# Patient Record
Sex: FEMALE | Race: BLACK OR AFRICAN AMERICAN
[De-identification: names, ages, dates, MRNs, and addresses within clinical notes are randomized per-mention and may not be internally consistent; named-entity substitution may affect disease eponyms.]

---

## 2017-09-14 ENCOUNTER — HOSPITAL (OUTPATIENT)
Dept: OTHER | Age: 52
End: 2017-09-14
Attending: EMERGENCY MEDICINE

## 2017-09-28 ENCOUNTER — HOSPITAL (OUTPATIENT)
Dept: OTHER | Age: 52
End: 2017-09-28
Attending: OBSTETRICS & GYNECOLOGY

## 2017-09-28 LAB
GLYCOHEMOGLOBIN: 6.3 % (ref 4.5–5.6)
TSH SERPL-ACNC: 2.3 MCUNIT/ML (ref 0.35–5)

## 2017-10-25 ENCOUNTER — HOSPITAL (OUTPATIENT)
Dept: OTHER | Age: 52
End: 2017-10-25
Attending: INTERNAL MEDICINE

## 2017-10-25 LAB
ALBUMIN SERPL-MCNC: 3.5 GM/DL (ref 3.6–5.1)
ALBUMIN/GLOB SERPL: 0.9 {RATIO} (ref 1–2.4)
ALP SERPL-CCNC: 77 UNIT/L (ref 45–117)
ALT SERPL-CCNC: 68 UNIT/L
ANION GAP SERPL CALC-SCNC: 12 MMOL/L (ref 10–20)
AST SERPL-CCNC: 31 UNIT/L
BILIRUB SERPL-MCNC: 0.2 MG/DL (ref 0.2–1)
BUN SERPL-MCNC: 9 MG/DL (ref 6–20)
BUN/CREAT SERPL: 14 (ref 7–25)
CALCIUM SERPL-MCNC: 8.5 MG/DL (ref 8.4–10.2)
CHLORIDE: 109 MMOL/L (ref 98–107)
CHOLEST SERPL-MCNC: 116 MG/DL
CHOLEST/HDLC SERPL: 1.8 {RATIO}
CO2 SERPL-SCNC: 27 MMOL/L (ref 21–32)
CREAT 24H UR-MRATE: 218 MG/DL
CREAT SERPL-MCNC: 0.66 MG/DL (ref 0.51–0.95)
GLOBULIN SER-MCNC: 3.9 GM/DL (ref 2–4)
GLUCOSE SERPL-MCNC: 92 MG/DL (ref 65–99)
HDLC SERPL-MCNC: 64 MG/DL
LDLC SERPL CALC-MCNC: 47 MG/DL
LENGTH OF FAST TIME PATIENT: 12 HR
LENGTH OF FAST TIME PATIENT: 12 HR
MICROALBUMIN UR-MCNC: 1.78 MG/DL
MICROALBUMIN/CREAT UR: 8.2 MCG/MG
NONHDLC SERPL-MCNC: 52 MG/DL
POTASSIUM SERPL-SCNC: 4.3 MMOL/L (ref 3.4–5.1)
PROT SERPL-MCNC: 7.4 GM/DL (ref 6.4–8.2)
SODIUM SERPL-SCNC: 144 MMOL/L (ref 135–145)
TRIGLYCERIDE (TRIGP): 23 MG/DL

## 2017-11-15 ENCOUNTER — NURSE ONLY (OUTPATIENT)
Dept: ENDOCRINOLOGY CLINIC | Facility: CLINIC | Age: 52
End: 2017-11-15

## 2017-11-15 VITALS — WEIGHT: 208 LBS | BODY MASS INDEX: 38 KG/M2 | DIASTOLIC BLOOD PRESSURE: 84 MMHG | SYSTOLIC BLOOD PRESSURE: 126 MMHG

## 2017-11-15 DIAGNOSIS — E11.9 DIABETES MELLITUS WITHOUT COMPLICATION (HCC): Primary | ICD-10-CM

## 2017-11-15 PROCEDURE — G0108 DIAB MANAGE TRN  PER INDIV: HCPCS | Performed by: DIETITIAN, REGISTERED

## 2017-11-15 RX ORDER — LOSARTAN POTASSIUM 50 MG/1
50 TABLET ORAL DAILY
COMMUNITY
End: 2019-02-03 | Stop reason: ALTCHOICE

## 2017-11-17 NOTE — PROGRESS NOTES
Madi Brigido  : 1965 attended Step 1 Diabetic Education:    Date: 11/15/2017  Referring MD: Dr. Justus Leon Start time: 4pm End time: 5pm    /84   Wt 208 lb   BMI 38.04 kg/m²     A1C: 17 -  6.3%     Depression Screen - PHQ-2 SCORE: 0 questions at this time.     Carmelo Mcarthur RN, CDE

## 2018-01-17 ENCOUNTER — NURSE ONLY (OUTPATIENT)
Dept: ENDOCRINOLOGY CLINIC | Facility: CLINIC | Age: 53
End: 2018-01-17

## 2018-01-17 DIAGNOSIS — E11.9 DIABETES MELLITUS WITHOUT COMPLICATION (HCC): Primary | ICD-10-CM

## 2018-01-17 PROCEDURE — G0109 DIAB MANAGE TRN IND/GROUP: HCPCS | Performed by: DIETITIAN, REGISTERED

## 2018-01-18 NOTE — PROGRESS NOTES
Carol Arevalo  [id number] attended Step 2 Diabetic Education:    Date: 1/18/2018 Referring MD: Jb Garrett Start time: 5:30pm End time: 7:30pm      Diabetes Overview, pathophysiology, pre-diabetes, A1C results and treatment options for diabetes mj

## 2018-02-20 ENCOUNTER — NURSE ONLY (OUTPATIENT)
Dept: FAMILY MEDICINE CLINIC | Facility: CLINIC | Age: 53
End: 2018-02-20

## 2018-02-20 VITALS
SYSTOLIC BLOOD PRESSURE: 124 MMHG | DIASTOLIC BLOOD PRESSURE: 66 MMHG | OXYGEN SATURATION: 98 % | RESPIRATION RATE: 18 BRPM | HEART RATE: 63 BPM | HEIGHT: 62 IN | TEMPERATURE: 98 F

## 2018-02-20 DIAGNOSIS — H60.501 ACUTE OTITIS EXTERNA OF RIGHT EAR, UNSPECIFIED TYPE: Primary | ICD-10-CM

## 2018-02-20 PROCEDURE — 99213 OFFICE O/P EST LOW 20 MIN: CPT | Performed by: NURSE PRACTITIONER

## 2018-02-20 NOTE — PROGRESS NOTES
HPI:    Patient ID: Melanie Rodriguez is a 45year old female. Ear Problem    There is pain in the right ear. This is a new problem. The current episode started in the past 7 days. The problem occurs constantly. The problem has been unchanged.  There has b motion. Lymphadenopathy:     She has no cervical adenopathy. Neurological: She is alert and oriented to person, place, and time. Skin: Skin is warm and dry. She is not diaphoretic. Psychiatric: She has a normal mood and affect.  Her behavior is norm bacteria or fungus. How is it treated? Your provider will carefully clean and dry your ear. If your ear is very swollen, he or she may insert a wick soaked with an antibiotic into the ear to get the medicine into the infected area.  You may need to put

## 2018-02-20 NOTE — PATIENT INSTRUCTIONS
Otitis Externa (Swimmer's Ear)   What is otitis externa? Otitis externa is an infection of the ear canal. Otitis externa is also called swimmer's ear. How does it occur? Bacteria and sometimes fungi may cause the infection.  It can result from an inju days.   How can I take care of myself? Follow the treatment plan prescribed by your healthcare provider. Your healthcare provider will tell you how to take care of your ear and how to remove the wick.    Keep water out of your ears until the infection is

## 2018-02-28 ENCOUNTER — HOSPITAL (OUTPATIENT)
Dept: OTHER | Age: 53
End: 2018-02-28
Attending: INTERNAL MEDICINE

## 2018-02-28 LAB — GLYCOHEMOGLOBIN: 6 % (ref 4.5–5.6)

## 2018-07-12 ENCOUNTER — HOSPITAL ENCOUNTER (OUTPATIENT)
Age: 53
Discharge: HOME | End: 2018-07-12
Payer: COMMERCIAL

## 2018-07-12 VITALS
SYSTOLIC BLOOD PRESSURE: 148 MMHG | OXYGEN SATURATION: 99 % | BODY MASS INDEX: 35.88 KG/M2 | DIASTOLIC BLOOD PRESSURE: 89 MMHG | WEIGHT: 195 LBS | HEIGHT: 62 IN | HEART RATE: 69 BPM | RESPIRATION RATE: 20 BRPM | TEMPERATURE: 98 F

## 2018-07-12 DIAGNOSIS — J20.9 ACUTE BRONCHITIS, UNSPECIFIED ORGANISM: Primary | ICD-10-CM

## 2018-07-12 PROCEDURE — 99214 OFFICE O/P EST MOD 30 MIN: CPT

## 2018-07-12 PROCEDURE — 99213 OFFICE O/P EST LOW 20 MIN: CPT

## 2018-07-12 RX ORDER — METHYLPREDNISOLONE 4 MG/1
TABLET ORAL
Qty: 1 PACKAGE | Refills: 0 | Status: SHIPPED | OUTPATIENT
Start: 2018-07-12 | End: 2019-02-03 | Stop reason: ALTCHOICE

## 2018-07-12 RX ORDER — AZITHROMYCIN 250 MG/1
TABLET, FILM COATED ORAL
Qty: 1 PACKAGE | Refills: 0 | Status: SHIPPED | OUTPATIENT
Start: 2018-07-12 | End: 2018-07-17

## 2018-07-12 NOTE — ED INITIAL ASSESSMENT (HPI)
C/O cough x15 days that is not improving. Sts symptoms are worse in the am. Sts other family members who have the same are better and she is not.

## 2018-07-13 NOTE — ED PROVIDER NOTES
Patient Seen in: THE [facility] END    History   Patient presents with:  Cough    Stated Complaint: cough     58-year-old female presents today with plaints of a productive cough pressure in the ears and some mild congestion.   [facility] [place] [facility] distress. HENT:   Head: Normocephalic. Right Ear: Hearing and tympanic membrane normal.   Left Ear: Hearing and tympanic membrane normal.   Nose: Mucosal edema present.    Mouth/Throat: Uvula is midline and mucous membranes are normal. Posterior orophar MG Oral Tablet Therapy Pack  Dosepack: take as directed  Qty: 1 Package Refills: 0

## 2018-10-12 ENCOUNTER — HOSPITAL (OUTPATIENT)
Dept: OTHER | Age: 53
End: 2018-10-12
Attending: INTERNAL MEDICINE

## 2018-10-12 LAB — GLYCOHEMOGLOBIN: 6.2 % (ref 4.5–5.6)

## 2018-10-28 ENCOUNTER — HOSPITAL ENCOUNTER (EMERGENCY)
Age: 53
Discharge: HOME | End: 2018-10-28
Payer: COMMERCIAL

## 2018-10-28 VITALS
SYSTOLIC BLOOD PRESSURE: 154 MMHG | WEIGHT: 195.13 LBS | RESPIRATION RATE: 20 BRPM | HEART RATE: 76 BPM | DIASTOLIC BLOOD PRESSURE: 78 MMHG | TEMPERATURE: 98 F | BODY MASS INDEX: 36 KG/M2 | OXYGEN SATURATION: 98 %

## 2018-10-28 DIAGNOSIS — B02.9 HERPES ZOSTER WITHOUT COMPLICATION: Primary | ICD-10-CM

## 2018-10-28 PROCEDURE — 99283 EMERGENCY DEPT VISIT LOW MDM: CPT

## 2018-10-28 RX ORDER — IBUPROFEN 800 MG/1
800 TABLET ORAL EVERY 8 HOURS PRN
Qty: 30 TABLET | Refills: 0 | Status: SHIPPED | OUTPATIENT
Start: 2018-10-28 | End: 2018-11-04

## 2018-10-28 RX ORDER — ACYCLOVIR 800 MG/1
800 TABLET ORAL
Qty: 35 TABLET | Refills: 0 | Status: SHIPPED | OUTPATIENT
Start: 2018-10-28 | End: 2018-11-04

## 2018-10-28 RX ORDER — TRAMADOL HYDROCHLORIDE 50 MG/1
50 TABLET ORAL EVERY 6 HOURS PRN
Qty: 18 TABLET | Refills: 0 | Status: SHIPPED | OUTPATIENT
Start: 2018-10-28 | End: 2018-11-04

## 2018-10-28 RX ORDER — TRAMADOL HYDROCHLORIDE 50 MG/1
TABLET ORAL EVERY 4 HOURS PRN
Qty: 10 TABLET | Refills: 0 | Status: SHIPPED | OUTPATIENT
Start: 2018-10-28 | End: 2018-10-28

## 2018-10-28 NOTE — ED INITIAL ASSESSMENT (HPI)
Pt c/o right shoulder pain and tingling that feels \"just like when I had shingles last night\". Pt denies rash or fever.

## 2018-10-29 NOTE — ED PROVIDER NOTES
Patient Seen in: THE [facility] Emergency Department In [place]    History   Patient presents with:  Pain (neurologic)    Stated Complaint: Pain    52-year-old female who presents to the emergency room with complaints of right arm pain.   Please states is a hi 76   Resp 20   Temp 98.4 °F (36.9 °C)   Temp src Temporal   SpO2 98 %   O2 Device None (Room air)       Current:/78   Pulse 76   Temp 98.4 °F (36.9 °C) (Temporal)   Resp 20   Wt 88.5 kg   SpO2 98%   BMI 35.69 kg/m²         Physical Exam  GENERAL: The writing. Patient and/or family agrees to return for any concerns, problems or complications as discussed and voiced understanding and all questions were answered at this time.             Disposition and Plan     Clinical Impression:  Herpes zoster without

## 2019-02-03 ENCOUNTER — OFFICE VISIT (OUTPATIENT)
Dept: FAMILY MEDICINE CLINIC | Facility: CLINIC | Age: 54
End: 2019-02-03
Payer: COMMERCIAL

## 2019-02-03 VITALS
TEMPERATURE: 98 F | HEIGHT: 62 IN | RESPIRATION RATE: 20 BRPM | BODY MASS INDEX: 36.44 KG/M2 | WEIGHT: 198 LBS | HEART RATE: 94 BPM | DIASTOLIC BLOOD PRESSURE: 80 MMHG | OXYGEN SATURATION: 98 % | SYSTOLIC BLOOD PRESSURE: 132 MMHG

## 2019-02-03 DIAGNOSIS — J01.40 ACUTE NON-RECURRENT PANSINUSITIS: Primary | ICD-10-CM

## 2019-02-03 PROCEDURE — 99213 OFFICE O/P EST LOW 20 MIN: CPT | Performed by: FAMILY MEDICINE

## 2019-02-03 RX ORDER — AZITHROMYCIN 250 MG/1
TABLET, FILM COATED ORAL
Qty: 6 TABLET | Refills: 0 | Status: SHIPPED | OUTPATIENT
Start: 2019-02-03 | End: 2019-06-10 | Stop reason: ALTCHOICE

## 2019-02-03 RX ORDER — FLUTICASONE PROPIONATE 50 MCG
2 SPRAY, SUSPENSION (ML) NASAL DAILY
Qty: 1 BOTTLE | Refills: 0 | Status: SHIPPED | OUTPATIENT
Start: 2019-02-03 | End: 2019-11-24

## 2019-02-03 NOTE — PROGRESS NOTES
Blanca Butler is a 52year old female. S:  Patient presents today with the following concerns:  URI (coughing up flem, post nasal drip, sinus pressure, ear popping, chills X 3 months )    No fevers, SOB, wheezing.   Symptoms going on and off for 3 jackie exertion  CARDIOVASCULAR: denies chest pain on exertion  GI: denies abdominal pain.   No N/V/D/C  : denies dysuria  MUSCULOSKELETAL: denies back pain  NEURO: denies headaches    EXAM:  /80   Pulse 94   Temp 98.2 °F (36.8 °C) (Oral)   Resp 20   Ht 62

## 2019-05-15 ENCOUNTER — HOSPITAL ENCOUNTER (OUTPATIENT)
Age: 54
Discharge: HOME | End: 2019-05-15
Attending: FAMILY MEDICINE
Payer: COMMERCIAL

## 2019-05-15 ENCOUNTER — APPOINTMENT (OUTPATIENT)
Dept: CT IMAGING | Age: 54
End: 2019-05-15
Attending: FAMILY MEDICINE
Payer: COMMERCIAL

## 2019-05-15 VITALS
TEMPERATURE: 99 F | HEART RATE: 62 BPM | OXYGEN SATURATION: 97 % | DIASTOLIC BLOOD PRESSURE: 77 MMHG | BODY MASS INDEX: 36 KG/M2 | SYSTOLIC BLOOD PRESSURE: 130 MMHG | WEIGHT: 198 LBS | RESPIRATION RATE: 18 BRPM

## 2019-05-15 DIAGNOSIS — R30.0 DYSURIA: ICD-10-CM

## 2019-05-15 DIAGNOSIS — R10.9 ABDOMINAL PAIN, ACUTE: ICD-10-CM

## 2019-05-15 DIAGNOSIS — R10.9 ACUTE FLANK PAIN: Primary | ICD-10-CM

## 2019-05-15 PROCEDURE — 87086 URINE CULTURE/COLONY COUNT: CPT | Performed by: FAMILY MEDICINE

## 2019-05-15 PROCEDURE — 99214 OFFICE O/P EST MOD 30 MIN: CPT

## 2019-05-15 PROCEDURE — 87660 TRICHOMONAS VAGIN DIR PROBE: CPT | Performed by: FAMILY MEDICINE

## 2019-05-15 PROCEDURE — 87510 GARDNER VAG DNA DIR PROBE: CPT | Performed by: FAMILY MEDICINE

## 2019-05-15 PROCEDURE — 87480 CANDIDA DNA DIR PROBE: CPT | Performed by: FAMILY MEDICINE

## 2019-05-15 PROCEDURE — 85025 COMPLETE CBC W/AUTO DIFF WBC: CPT | Performed by: FAMILY MEDICINE

## 2019-05-15 PROCEDURE — 87491 CHLMYD TRACH DNA AMP PROBE: CPT | Performed by: FAMILY MEDICINE

## 2019-05-15 PROCEDURE — 80053 COMPREHEN METABOLIC PANEL: CPT | Performed by: FAMILY MEDICINE

## 2019-05-15 PROCEDURE — 87591 N.GONORRHOEAE DNA AMP PROB: CPT | Performed by: FAMILY MEDICINE

## 2019-05-15 PROCEDURE — 36415 COLL VENOUS BLD VENIPUNCTURE: CPT

## 2019-05-15 PROCEDURE — 80047 BASIC METABLC PNL IONIZED CA: CPT

## 2019-05-15 PROCEDURE — 74176 CT ABD & PELVIS W/O CONTRAST: CPT | Performed by: FAMILY MEDICINE

## 2019-05-15 PROCEDURE — 81002 URINALYSIS NONAUTO W/O SCOPE: CPT | Performed by: FAMILY MEDICINE

## 2019-05-15 RX ORDER — NITROFURANTOIN 25; 75 MG/1; MG/1
100 CAPSULE ORAL 2 TIMES DAILY
Qty: 20 CAPSULE | Refills: 0 | Status: SHIPPED | OUTPATIENT
Start: 2019-05-15 | End: 2019-05-25

## 2019-05-15 RX ORDER — NAPROXEN 500 MG/1
500 TABLET ORAL 2 TIMES DAILY PRN
Qty: 20 TABLET | Refills: 0 | Status: SHIPPED | OUTPATIENT
Start: 2019-05-15 | End: 2019-05-22

## 2019-05-15 NOTE — ED INITIAL ASSESSMENT (HPI)
Right flank pain - x 2 weeks constant, no pain emds taken   frequency- x 2 weeks. Denies fever or blood in urine,  Burning with uriantion -s tarted today  Clitoris- itching stared lastthursday, took diflucan Monday. Pt still having symptoms.  Denies any vag

## 2019-05-15 NOTE — ED PROVIDER NOTES
Patient Seen in: [facility] END    History   Patient presents with:  Urinary Symptoms (urologic)  Flank Pain    Stated Complaint: 1-2 weeks back pain,bladder issues,frequrncy    HPI  53 y/o female presents with  c/o right flank pain normal. No stridor. No respiratory distress. She has no wheezes. She has no rales. Abdominal: Soft. She exhibits no mass. There is tenderness. There is no guarding. Right upper and right lower abdominal tenderness, with mild right cva tenderness .  No g limited evaluation without contrast. BILIARY:   Cholecystectomy clips. . SPLEEN:  Normal.  No enlargement or focal lesion. PANCREAS:  No elidia-pancreatic inflammatory stranding ADRENALS:  Normal.  No mass or enlargement.   KIDNEYS:  The kidneys are symmetric capsule, R-0

## 2019-05-17 ENCOUNTER — TELEPHONE (OUTPATIENT)
Dept: URGENT CARE | Age: 54
End: 2019-05-17

## 2019-05-17 NOTE — ED NOTES
Patient called back, ID per date of birth. Made aware of final urine, GC/Chlamydia and COMP metabolic panel results.

## 2019-05-17 NOTE — ED NOTES
Called to patient, message left to voicemail to call [facility] back.   (this is to notify of final urine culture, GC/Chlamydia, Vaginitis panel and Comp Metabolic panel results.)

## 2019-06-07 ENCOUNTER — HOSPITAL (OUTPATIENT)
Dept: OTHER | Age: 54
End: 2019-06-07
Attending: INTERNAL MEDICINE

## 2019-06-07 LAB
AMYLASE SERPL-CCNC: 43 UNITS/L (ref 25–115)
ERYTHROCYTE [SEDIMENTATION RATE] IN BLOOD BY WESTERGREN METHOD: 38 MM/HR (ref 0–20)
HBA1C MFR BLD: 10.0           24 %
HBA1C MFR BLD: 6 % (ref 4.5–5.6)
HBA1C MFR BLD: 6.0            126 %
HBA1C MFR BLD: 6.5            14 %
HBA1C MFR BLD: 7.0            154 %
HBA1C MFR BLD: 7.5            169 %
HBA1C MFR BLD: 8.0            183 %
HBA1C MFR BLD: 8.5            197 %
HBA1C MFR BLD: 9.0            212 %
HBA1C MFR BLD: 9.5            226 %
HBA1C MFR BLD: ABNORMAL %

## 2019-11-24 ENCOUNTER — OFFICE VISIT (OUTPATIENT)
Dept: FAMILY MEDICINE CLINIC | Facility: CLINIC | Age: 54
End: 2019-11-24
Payer: COMMERCIAL

## 2019-11-24 VITALS
DIASTOLIC BLOOD PRESSURE: 84 MMHG | TEMPERATURE: 98 F | OXYGEN SATURATION: 97 % | HEART RATE: 55 BPM | BODY MASS INDEX: 33.13 KG/M2 | WEIGHT: 180 LBS | HEIGHT: 62 IN | RESPIRATION RATE: 20 BRPM | SYSTOLIC BLOOD PRESSURE: 124 MMHG

## 2019-11-24 DIAGNOSIS — R01.1 HEART MURMUR: ICD-10-CM

## 2019-11-24 DIAGNOSIS — J01.00 ACUTE MAXILLARY SINUSITIS, RECURRENCE NOT SPECIFIED: Primary | ICD-10-CM

## 2019-11-24 PROCEDURE — 99213 OFFICE O/P EST LOW 20 MIN: CPT | Performed by: PHYSICIAN ASSISTANT

## 2019-11-24 RX ORDER — AZITHROMYCIN 250 MG/1
TABLET, FILM COATED ORAL
Qty: 6 TABLET | Refills: 0 | Status: SHIPPED | OUTPATIENT
Start: 2019-11-24 | End: 2019-12-19 | Stop reason: ALTCHOICE

## 2019-11-24 RX ORDER — FLUTICASONE PROPIONATE 50 MCG
2 SPRAY, SUSPENSION (ML) NASAL DAILY
Qty: 1 BOTTLE | Refills: 0 | Status: SHIPPED | OUTPATIENT
Start: 2019-11-24 | End: 2019-12-08

## 2019-11-24 NOTE — PATIENT INSTRUCTIONS
Patient Declined AVS    Verbal Instructions given      1. Z tammy  2. Flonase  3. Follow up with PCP ASAP for murmur  4.  If worsening symptoms seek treatment

## 2019-11-24 NOTE — PROGRESS NOTES
CHIEF COMPLAINT:     Patient presents with:  Sinus Problem: s/s for 3 weeks, No OTC meds taken  Cough: wet      HPI:   Corey Singh is a 62year old female who presents with complaints of feeling sick for 3 weeks.   Symptoms include nasal congestion, abhishek retraction, no fluid, bony landmarks normal.  Left TM normal, no bulging, no retraction, no fluid, bony landmarks normal.    NOSE: nostrils patent, + discharge, nasal mucosa pink and not inflamed.  +maxillary sinus tenderness.   THROAT: oral mucosa pink, mo

## 2020-07-14 ENCOUNTER — TELEPHONE (OUTPATIENT)
Dept: ADMINISTRATIVE | Facility: HOSPITAL | Age: 55
End: 2020-07-14

## 2020-07-14 DIAGNOSIS — Z11.59 ENCOUNTER FOR SCREENING FOR OTHER VIRAL DISEASES: Primary | ICD-10-CM

## 2020-07-15 ENCOUNTER — LAB ENCOUNTER (OUTPATIENT)
Dept: LAB | Facility: HOSPITAL | Age: 55
End: 2020-07-15
Attending: NURSE PRACTITIONER
Payer: COMMERCIAL

## 2020-07-15 DIAGNOSIS — Z11.59 ENCOUNTER FOR SCREENING FOR OTHER VIRAL DISEASES: ICD-10-CM

## 2020-07-20 LAB — SARS-COV-2 BY PCR: NOT DETECTED

## 2020-11-03 ENCOUNTER — APPOINTMENT (OUTPATIENT)
Dept: LAB | Age: 55
End: 2020-11-03
Attending: INTERNAL MEDICINE
Payer: COMMERCIAL

## 2020-11-03 DIAGNOSIS — Z01.818 PRE-OP TESTING: ICD-10-CM

## 2020-11-06 PROBLEM — K64.8 INTERNAL HEMORRHOIDS: Status: ACTIVE | Noted: 2020-11-06

## 2020-11-06 PROBLEM — Z80.0 FAMILY HISTORY OF COLON CANCER: Status: ACTIVE | Noted: 2020-11-06

## 2020-11-06 PROBLEM — Z86.0101 HISTORY OF ADENOMATOUS POLYP OF COLON: Status: ACTIVE | Noted: 2020-11-06

## 2020-11-06 PROBLEM — Z86.010 HISTORY OF ADENOMATOUS POLYP OF COLON: Status: ACTIVE | Noted: 2020-11-06

## 2020-11-06 PROBLEM — K63.5 COLON POLYPS: Status: ACTIVE | Noted: 2020-11-06

## 2021-02-01 ENCOUNTER — IMMUNIZATION (OUTPATIENT)
Dept: LAB | Age: 56
End: 2021-02-01

## 2021-02-01 DIAGNOSIS — Z23 NEED FOR VACCINATION: Primary | ICD-10-CM

## 2021-02-01 PROCEDURE — 0011A COVID-19 MODERNA VACCINE: CPT

## 2021-02-01 PROCEDURE — 91301 COVID-19 MODERNA VACCINE: CPT

## 2021-02-22 ENCOUNTER — TELEPHONE (OUTPATIENT)
Dept: PHYSICAL THERAPY | Facility: HOSPITAL | Age: 56
End: 2021-02-22

## 2021-02-23 ENCOUNTER — OFFICE VISIT (OUTPATIENT)
Dept: PHYSICAL THERAPY | Age: 56
End: 2021-02-23
Attending: ORTHOPAEDIC SURGERY
Payer: COMMERCIAL

## 2021-02-23 DIAGNOSIS — M76.61 ACHILLES TENDINITIS OF RIGHT LOWER EXTREMITY: ICD-10-CM

## 2021-02-23 PROCEDURE — 97162 PT EVAL MOD COMPLEX 30 MIN: CPT | Performed by: PHYSICAL THERAPIST

## 2021-02-23 PROCEDURE — 97110 THERAPEUTIC EXERCISES: CPT | Performed by: PHYSICAL THERAPIST

## 2021-02-23 NOTE — PROGRESS NOTES
LOWER EXTREMITY EVALUATION:   Referring Physician: Dr. Hossein Cain  Diagnosis: Achilles tendinitis of right lower extremity (M76.61)        Date of Service: 2/23/2021     PATIENT SUMMARY   Marquita Vail is a 60year old female who presents to therapy toda right LE  Palpation: Acute pain right achilles, no plantar pain        AROM: (* denotes performed with pain)  Decreased figure four right LE - lateral hip pain  NWB ankle mobility is WNL.  There is decreased right ankle DF with weighted DF split stance and contact with the floor and limited her pain. There was increased achilles tenderness after evaluation. CP applied 15 minutes with good relief. Patient will continue with using cold after activity as needed.   Patient was appreciative of the assistance prov

## 2021-02-25 ENCOUNTER — OFFICE VISIT (OUTPATIENT)
Dept: PHYSICAL THERAPY | Age: 56
End: 2021-02-25
Attending: ORTHOPAEDIC SURGERY
Payer: COMMERCIAL

## 2021-02-25 PROCEDURE — 97110 THERAPEUTIC EXERCISES: CPT | Performed by: PHYSICAL THERAPIST

## 2021-02-25 PROCEDURE — 97140 MANUAL THERAPY 1/> REGIONS: CPT | Performed by: PHYSICAL THERAPIST

## 2021-02-25 NOTE — PROGRESS NOTES
Dx: Achilles tendinitis of right lower extremity (M76.61)           Authorized # of Visits:  8  Fall Risk: standard         Precautions: n/a             Subjective:   Patient states she was able to complete her HEP, no increase in pain   Objective:   No ga

## 2021-03-01 ENCOUNTER — IMMUNIZATION (OUTPATIENT)
Dept: LAB | Age: 56
End: 2021-03-01

## 2021-03-01 DIAGNOSIS — Z23 NEED FOR VACCINATION: Primary | ICD-10-CM

## 2021-03-01 PROCEDURE — 91301 COVID-19 MODERNA VACCINE: CPT

## 2021-03-01 PROCEDURE — 0012A COVID-19 MODERNA VACCINE: CPT

## 2021-03-03 ENCOUNTER — OFFICE VISIT (OUTPATIENT)
Dept: PHYSICAL THERAPY | Age: 56
End: 2021-03-03
Attending: ORTHOPAEDIC SURGERY
Payer: COMMERCIAL

## 2021-03-03 PROCEDURE — 97140 MANUAL THERAPY 1/> REGIONS: CPT | Performed by: PHYSICAL THERAPIST

## 2021-03-03 PROCEDURE — 97110 THERAPEUTIC EXERCISES: CPT | Performed by: PHYSICAL THERAPIST

## 2021-03-03 NOTE — PROGRESS NOTES
Dx: Achilles tendinitis of right lower extremity (M76.61)           Authorized # of Visits:  8  Fall Risk: standard         Precautions: n/a             Subjective:   Patient states her achilles is feeling better. She has increased pain in the morning.  She

## 2021-03-09 ENCOUNTER — OFFICE VISIT (OUTPATIENT)
Dept: PHYSICAL THERAPY | Age: 56
End: 2021-03-09
Attending: ORTHOPAEDIC SURGERY
Payer: COMMERCIAL

## 2021-03-09 PROCEDURE — 97110 THERAPEUTIC EXERCISES: CPT | Performed by: PHYSICAL THERAPIST

## 2021-03-09 PROCEDURE — 97140 MANUAL THERAPY 1/> REGIONS: CPT | Performed by: PHYSICAL THERAPIST

## 2021-03-09 NOTE — PROGRESS NOTES
Dx: Achilles tendinitis of right lower extremity (M76.61)           Authorized # of Visits:  8  Fall Risk: standard         Precautions: n/a             Subjective:   Patient states there is mild pain at her achilles when walking   Objective:   Treatment a Treatment Time: 40 min

## 2021-03-11 ENCOUNTER — APPOINTMENT (OUTPATIENT)
Dept: PHYSICAL THERAPY | Age: 56
End: 2021-03-11
Attending: ORTHOPAEDIC SURGERY
Payer: COMMERCIAL

## 2021-03-16 ENCOUNTER — APPOINTMENT (OUTPATIENT)
Dept: PHYSICAL THERAPY | Age: 56
End: 2021-03-16
Attending: ORTHOPAEDIC SURGERY
Payer: COMMERCIAL

## 2021-03-18 ENCOUNTER — OFFICE VISIT (OUTPATIENT)
Dept: PHYSICAL THERAPY | Age: 56
End: 2021-03-18
Attending: ORTHOPAEDIC SURGERY
Payer: COMMERCIAL

## 2021-03-18 PROCEDURE — 97110 THERAPEUTIC EXERCISES: CPT | Performed by: PHYSICAL THERAPIST

## 2021-03-18 PROCEDURE — 97140 MANUAL THERAPY 1/> REGIONS: CPT | Performed by: PHYSICAL THERAPIST

## 2021-03-18 NOTE — PROGRESS NOTES
Dx: Achilles tendinitis of right lower extremity (M76.61)           Authorized # of Visits:  8  Fall Risk: standard         Precautions: n/a             Subjective:   Patient states pain in the morning has not been bad as she stretches prior to getting bed support (HEP)      Soft tissue release right achilles, calf, plantar fascia, calf stretch with soft tissue mobilization, hind foot mobility  Soft tissue release right achilles, calf, plantar fascia, calf stretch with soft tissue mobilization, hind foot mob

## 2021-03-23 ENCOUNTER — APPOINTMENT (OUTPATIENT)
Dept: PHYSICAL THERAPY | Age: 56
End: 2021-03-23
Attending: ORTHOPAEDIC SURGERY
Payer: COMMERCIAL

## 2021-03-23 ENCOUNTER — TELEPHONE (OUTPATIENT)
Dept: PHYSICAL THERAPY | Facility: HOSPITAL | Age: 56
End: 2021-03-23

## 2021-03-29 ENCOUNTER — OFFICE VISIT (OUTPATIENT)
Dept: PHYSICAL THERAPY | Age: 56
End: 2021-03-29
Attending: ORTHOPAEDIC SURGERY
Payer: COMMERCIAL

## 2021-03-29 PROCEDURE — 97140 MANUAL THERAPY 1/> REGIONS: CPT | Performed by: PHYSICAL THERAPIST

## 2021-03-29 PROCEDURE — 97110 THERAPEUTIC EXERCISES: CPT | Performed by: PHYSICAL THERAPIST

## 2021-03-29 NOTE — PROGRESS NOTES
Dx: Achilles tendinitis of right lower extremity (M76.61)           Authorized # of Visits:  8  Fall Risk: standard         Precautions: n/a             Subjective:   Patient states she has resumed walking on her treadmill and is using the elliptical. Ther support (HEP) Single leg balance on airex pad > 5 reps right and left LE               Manual stretch right gastroc/soleus      Soft tissue release right achilles, calf, plantar fascia, calf stretch with soft tissue mobilization, hind foot mobility  Soft t

## 2021-03-31 ENCOUNTER — OFFICE VISIT (OUTPATIENT)
Dept: PHYSICAL THERAPY | Age: 56
End: 2021-03-31
Attending: ORTHOPAEDIC SURGERY
Payer: COMMERCIAL

## 2021-03-31 PROCEDURE — 97140 MANUAL THERAPY 1/> REGIONS: CPT | Performed by: PHYSICAL THERAPIST

## 2021-03-31 PROCEDURE — 97110 THERAPEUTIC EXERCISES: CPT | Performed by: PHYSICAL THERAPIST

## 2021-03-31 PROCEDURE — 97112 NEUROMUSCULAR REEDUCATION: CPT | Performed by: PHYSICAL THERAPIST

## 2021-03-31 NOTE — PROGRESS NOTES
Dx: Achilles tendinitis of right lower extremity (M76.61)           Authorized # of Visits:  8  Fall Risk: standard         Precautions: n/a             Subjective:   Patient states right achilles pain is greatest after sitting and first thing in the morni (HEP) Forward/retro step left LE with manual hold at anterior right ankle  Hip/hamstring mobility on step > 15 reps each     Right and left DF on step  Closed chain hip mobility with transverse plane UE   Closed chain crossover step right and left LE

## 2021-04-07 ENCOUNTER — APPOINTMENT (OUTPATIENT)
Dept: PHYSICAL THERAPY | Age: 56
End: 2021-04-07
Attending: ORTHOPAEDIC SURGERY
Payer: COMMERCIAL

## 2021-04-21 ENCOUNTER — TELEPHONE (OUTPATIENT)
Dept: PHYSICAL THERAPY | Facility: HOSPITAL | Age: 56
End: 2021-04-21

## 2021-04-26 ENCOUNTER — APPOINTMENT (OUTPATIENT)
Dept: PHYSICAL THERAPY | Age: 56
End: 2021-04-26
Attending: ORTHOPAEDIC SURGERY
Payer: COMMERCIAL

## 2021-07-06 ENCOUNTER — OFFICE VISIT (OUTPATIENT)
Dept: PODIATRY CLINIC | Facility: CLINIC | Age: 56
End: 2021-07-06
Payer: COMMERCIAL

## 2021-07-06 DIAGNOSIS — B35.1 ONYCHOMYCOSIS: Primary | ICD-10-CM

## 2021-07-06 DIAGNOSIS — L60.0 ONYCHOCRYPTOSIS: ICD-10-CM

## 2021-07-06 DIAGNOSIS — M79.674 PAIN OF TOE OF RIGHT FOOT: ICD-10-CM

## 2021-07-06 PROCEDURE — 99203 OFFICE O/P NEW LOW 30 MIN: CPT | Performed by: PODIATRIST

## 2021-07-06 RX ORDER — ACYCLOVIR 400 MG/1
1 TABLET ORAL 4 TIMES DAILY
COMMUNITY
Start: 2021-06-15 | End: 2021-08-29

## 2021-07-07 NOTE — PROGRESS NOTES
Oracio Narayanan is a 60year old female. Patient presents with:   Foot Pain: c/o Right big toenail pain, for a few years, possible removal requested        HPI:   This pleasant patient presents to the clinic with a chief complaint of right great toenail cass below  GENERAL HEALTH: feels well otherwise  SKIN: Refer to exam below  RESPIRATORY: denies shortness of breath with exertion  CARDIOVASCULAR: denies chest pain on exertion  GI: denies abdominal pain and denies heartburn  NEURO: denies headaches    EXAM:

## 2021-07-29 ENCOUNTER — HOSPITAL ENCOUNTER (OUTPATIENT)
Age: 56
Discharge: HOME | End: 2021-07-29
Attending: EMERGENCY MEDICINE
Payer: COMMERCIAL

## 2021-07-29 VITALS
DIASTOLIC BLOOD PRESSURE: 86 MMHG | RESPIRATION RATE: 20 BRPM | SYSTOLIC BLOOD PRESSURE: 140 MMHG | HEART RATE: 58 BPM | TEMPERATURE: 97 F

## 2021-07-29 DIAGNOSIS — G56.91 NEUROPATHY, ARM, RIGHT: Primary | ICD-10-CM

## 2021-07-29 PROCEDURE — 99212 OFFICE O/P EST SF 10 MIN: CPT

## 2021-07-29 NOTE — ED PROVIDER NOTES
Patient Seen in: [facility]      History   Patient presents with:  Derm Problem    Stated Complaint: right arm pain /lump x 2 weeks    HPI/Subjective:   HPI    Patient is a pleasant 58-year-old female, presenting for evaluation of right a External ear normal.      Left Ear: External ear normal.   Eyes:      Extraocular Movements: Extraocular movements intact. Conjunctiva/sclera: Conjunctivae normal.   Cardiovascular:      Rate and Rhythm: Normal rate.    Pulmonary:      Effort: Pulmonar days  For wound re-check          Medications Prescribed:  Current Discharge Medication List

## 2021-08-09 ENCOUNTER — TELEPHONE (OUTPATIENT)
Dept: PODIATRY CLINIC | Facility: CLINIC | Age: 56
End: 2021-08-09

## 2021-08-09 DIAGNOSIS — B35.1 ONYCHOMYCOSIS: Primary | ICD-10-CM

## 2021-08-09 NOTE — TELEPHONE ENCOUNTER
----- Message from Medardo Dockery DPM sent at 7/18/2021 11:10 AM CDT -----  Results reviewed. Please inform patient that fungal culture results are positive and we should proceed with Lamisil tablet therapy.   If the patient agrees we have to do a hepa

## 2021-08-09 NOTE — TELEPHONE ENCOUNTER
Spoke to patient and relayed Dr. Renetta Alvarez message as shown below. Patient verbalized understanding and agreement to proceed with lamisil tablet therapy. Liver panel order entered.

## 2021-08-10 ENCOUNTER — HOSPITAL ENCOUNTER (OUTPATIENT)
Age: 56
Discharge: HOME | End: 2021-08-10
Payer: COMMERCIAL

## 2021-08-10 ENCOUNTER — LAB ENCOUNTER (OUTPATIENT)
Dept: LAB | Age: 56
End: 2021-08-10
Attending: PODIATRIST
Payer: COMMERCIAL

## 2021-08-10 VITALS
TEMPERATURE: 98 F | HEIGHT: 62 IN | RESPIRATION RATE: 16 BRPM | DIASTOLIC BLOOD PRESSURE: 80 MMHG | BODY MASS INDEX: 34.96 KG/M2 | OXYGEN SATURATION: 99 % | WEIGHT: 190 LBS | SYSTOLIC BLOOD PRESSURE: 143 MMHG | HEART RATE: 60 BPM

## 2021-08-10 DIAGNOSIS — B35.1 ONYCHOMYCOSIS: ICD-10-CM

## 2021-08-10 DIAGNOSIS — S61.211A LACERATION OF LEFT INDEX FINGER WITHOUT FOREIGN BODY WITHOUT DAMAGE TO NAIL, INITIAL ENCOUNTER: Primary | ICD-10-CM

## 2021-08-10 LAB
ALBUMIN SERPL-MCNC: 3.5 G/DL (ref 3.4–5)
ALP LIVER SERPL-CCNC: 68 U/L
ALT SERPL-CCNC: 33 U/L
AST SERPL-CCNC: 17 U/L (ref 15–37)
BILIRUB DIRECT SERPL-MCNC: <0.1 MG/DL (ref 0–0.2)
BILIRUB SERPL-MCNC: 0.4 MG/DL (ref 0.1–2)
M PROTEIN MFR SERPL ELPH: 7.4 G/DL (ref 6.4–8.2)

## 2021-08-10 PROCEDURE — 80076 HEPATIC FUNCTION PANEL: CPT

## 2021-08-10 PROCEDURE — 99212 OFFICE O/P EST SF 10 MIN: CPT

## 2021-08-10 PROCEDURE — 36415 COLL VENOUS BLD VENIPUNCTURE: CPT

## 2021-08-10 NOTE — ED PROVIDER NOTES
Patient Seen in: [facility] [place]      History   Patient presents with:  Laceration    Stated Complaint: left finger cut    HPI/Subjective:   HPI  Patient is 61-year-old female past medical history of depression presents with left index finger Effort: Pulmonary effort is normal.   Musculoskeletal:      Comments: Left hand Exam:  1 cm healing laceration left index finger volar aspect over DIP. No signs of secondary infection. No drainage   Radial, median, ulnar nerves grossly intact.   Sensation

## 2021-08-11 ENCOUNTER — TELEPHONE (OUTPATIENT)
Dept: PODIATRY CLINIC | Facility: CLINIC | Age: 56
End: 2021-08-11

## 2021-08-11 RX ORDER — TERBINAFINE HYDROCHLORIDE 250 MG/1
250 TABLET ORAL DAILY
Qty: 45 TABLET | Refills: 0 | Status: SHIPPED | OUTPATIENT
Start: 2021-08-11 | End: 2021-08-29

## 2021-08-11 NOTE — TELEPHONE ENCOUNTER
----- Message from Vinod Perez DPM sent at 8/11/2021 11:53 AM CDT -----  Hepatic function panel is normal please call in the following prescription:   Lamisil 250 mg tablets  Dispense 45, no refill  Instructions take 1 tablet daily p.o.   Then please

## 2021-08-11 NOTE — TELEPHONE ENCOUNTER
Called pt and confirmed name and . Informed her of results and Dr. Kuldip Bonilla orders. Pharm confirmed. Pt already has f/u scheduled in 4 wks.

## 2021-08-27 ENCOUNTER — TELEPHONE (OUTPATIENT)
Dept: PODIATRY CLINIC | Facility: CLINIC | Age: 56
End: 2021-08-27

## 2021-08-27 NOTE — TELEPHONE ENCOUNTER
S/w pt and she states she started taking the lamisil on 8/24/21 and within a few hours she started getting stomach upset- diarrhea, lower mid abdominal pain, abdominal cramping, loss of appetite.  She denies any fever, chills, bloating, nausea, vomiting, bl

## 2021-08-27 NOTE — TELEPHONE ENCOUNTER
Since she had the same symptoms without any Lamisil my recommendation would to refer her back to her PCP for evaluation of her nausea and vomiting.   Nausea vomiting is a possible risk with Lamisil however she could have other problems that should be evalua

## 2021-08-29 ENCOUNTER — APPOINTMENT (OUTPATIENT)
Dept: CT IMAGING | Age: 56
End: 2021-08-29
Attending: EMERGENCY MEDICINE
Payer: COMMERCIAL

## 2021-08-29 ENCOUNTER — HOSPITAL ENCOUNTER (OUTPATIENT)
Age: 56
Discharge: ANOTHER HOSPITAL | End: 2021-08-29
Attending: EMERGENCY MEDICINE
Payer: COMMERCIAL

## 2021-08-29 ENCOUNTER — HOSPITAL ENCOUNTER (EMERGENCY)
Age: 56
Discharge: HOME | End: 2021-08-29
Attending: EMERGENCY MEDICINE
Payer: COMMERCIAL

## 2021-08-29 VITALS
TEMPERATURE: 98 F | OXYGEN SATURATION: 99 % | HEART RATE: 58 BPM | RESPIRATION RATE: 14 BRPM | HEIGHT: 62 IN | DIASTOLIC BLOOD PRESSURE: 76 MMHG | BODY MASS INDEX: 35.88 KG/M2 | WEIGHT: 195 LBS | SYSTOLIC BLOOD PRESSURE: 146 MMHG

## 2021-08-29 VITALS
BODY MASS INDEX: 35.88 KG/M2 | HEIGHT: 62 IN | HEART RATE: 112 BPM | OXYGEN SATURATION: 99 % | TEMPERATURE: 97 F | SYSTOLIC BLOOD PRESSURE: 150 MMHG | WEIGHT: 195 LBS | RESPIRATION RATE: 20 BRPM | DIASTOLIC BLOOD PRESSURE: 90 MMHG

## 2021-08-29 DIAGNOSIS — R10.9 ABDOMINAL PAIN OF UNKNOWN ETIOLOGY: Primary | ICD-10-CM

## 2021-08-29 DIAGNOSIS — R19.7 DIARRHEA, UNSPECIFIED TYPE: ICD-10-CM

## 2021-08-29 DIAGNOSIS — A09 TRAVELER'S DIARRHEA: Primary | ICD-10-CM

## 2021-08-29 LAB
ALBUMIN SERPL-MCNC: 3.6 G/DL (ref 3.4–5)
ALBUMIN/GLOB SERPL: 0.8 {RATIO} (ref 1–2)
ALP LIVER SERPL-CCNC: 77 U/L
ALT SERPL-CCNC: 35 U/L
ANION GAP SERPL CALC-SCNC: 2 MMOL/L (ref 0–18)
AST SERPL-CCNC: 27 U/L (ref 15–37)
BASOPHILS # BLD AUTO: 0.03 X10(3) UL (ref 0–0.2)
BASOPHILS NFR BLD AUTO: 0.3 %
BILIRUB SERPL-MCNC: 0.4 MG/DL (ref 0.1–2)
BUN BLD-MCNC: 10 MG/DL (ref 7–18)
CALCIUM BLD-MCNC: 9.1 MG/DL (ref 8.5–10.1)
CHLORIDE SERPL-SCNC: 110 MMOL/L (ref 98–112)
CO2 SERPL-SCNC: 27 MMOL/L (ref 21–32)
CREAT BLD-MCNC: 0.76 MG/DL
EOSINOPHIL # BLD AUTO: 0.12 X10(3) UL (ref 0–0.7)
EOSINOPHIL NFR BLD AUTO: 1.2 %
ERYTHROCYTE [DISTWIDTH] IN BLOOD BY AUTOMATED COUNT: 14.8 %
GLOBULIN PLAS-MCNC: 4.4 G/DL (ref 2.8–4.4)
GLUCOSE BLD-MCNC: 89 MG/DL (ref 70–99)
HCT VFR BLD AUTO: 39.8 %
HGB BLD-MCNC: 12.1 G/DL
IMM GRANULOCYTES # BLD AUTO: 0.04 X10(3) UL (ref 0–1)
IMM GRANULOCYTES NFR BLD: 0.4 %
LIPASE SERPL-CCNC: 115 U/L (ref 73–393)
LYMPHOCYTES # BLD AUTO: 3.87 X10(3) UL (ref 1–4)
LYMPHOCYTES NFR BLD AUTO: 39.3 %
M PROTEIN MFR SERPL ELPH: 8 G/DL (ref 6.4–8.2)
MCH RBC QN AUTO: 27.8 PG (ref 26–34)
MCHC RBC AUTO-ENTMCNC: 30.4 G/DL (ref 31–37)
MCV RBC AUTO: 91.3 FL
MONOCYTES # BLD AUTO: 0.98 X10(3) UL (ref 0.1–1)
MONOCYTES NFR BLD AUTO: 9.9 %
NEUTROPHILS # BLD AUTO: 4.81 X10 (3) UL (ref 1.5–7.7)
NEUTROPHILS # BLD AUTO: 4.81 X10(3) UL (ref 1.5–7.7)
NEUTROPHILS NFR BLD AUTO: 48.9 %
OSMOLALITY SERPL CALC.SUM OF ELEC: 287 MOSM/KG (ref 275–295)
PLATELET # BLD AUTO: 239 10(3)UL (ref 150–450)
POTASSIUM SERPL-SCNC: 4.5 MMOL/L (ref 3.5–5.1)
RBC # BLD AUTO: 4.36 X10(6)UL
SODIUM SERPL-SCNC: 139 MMOL/L (ref 136–145)
WBC # BLD AUTO: 9.9 X10(3) UL (ref 4–11)

## 2021-08-29 PROCEDURE — 99284 EMERGENCY DEPT VISIT MOD MDM: CPT

## 2021-08-29 PROCEDURE — 96360 HYDRATION IV INFUSION INIT: CPT

## 2021-08-29 PROCEDURE — 80053 COMPREHEN METABOLIC PANEL: CPT | Performed by: EMERGENCY MEDICINE

## 2021-08-29 PROCEDURE — 74177 CT ABD & PELVIS W/CONTRAST: CPT | Performed by: EMERGENCY MEDICINE

## 2021-08-29 PROCEDURE — 83690 ASSAY OF LIPASE: CPT | Performed by: EMERGENCY MEDICINE

## 2021-08-29 PROCEDURE — 99212 OFFICE O/P EST SF 10 MIN: CPT

## 2021-08-29 PROCEDURE — 85025 COMPLETE CBC W/AUTO DIFF WBC: CPT | Performed by: EMERGENCY MEDICINE

## 2021-08-29 RX ORDER — CIPROFLOXACIN 500 MG/1
500 TABLET, FILM COATED ORAL 2 TIMES DAILY
Qty: 6 TABLET | Refills: 0 | Status: SHIPPED | OUTPATIENT
Start: 2021-08-29 | End: 2021-09-01

## 2021-08-29 RX ORDER — ONDANSETRON 4 MG/1
4 TABLET, ORALLY DISINTEGRATING ORAL EVERY 4 HOURS PRN
Qty: 10 TABLET | Refills: 0 | Status: SHIPPED | OUTPATIENT
Start: 2021-08-29 | End: 2021-09-05

## 2021-08-29 RX ORDER — DICYCLOMINE HCL 20 MG
20 TABLET ORAL 4 TIMES DAILY PRN
Qty: 30 TABLET | Refills: 0 | Status: SHIPPED | OUTPATIENT
Start: 2021-08-29 | End: 2021-09-28

## 2021-08-29 NOTE — ED INITIAL ASSESSMENT (HPI)
Pt as been in [place] and she has had diarrhea for 10 days. Pt has been feeling \"woozy, and has had heart palpitations as well.  She is not feeling well

## 2021-08-29 NOTE — ED PROVIDER NOTES
Patient Seen in: THE [facility] Emergency Department In [place]      History   Patient presents with:  Abdominal Pain    Stated Complaint: abdominal pain and diarrhea    HPI/Subjective:   HPI    Patient is a 20-year-old female presenting with persistent abdom equal, round, and reactive to light. Cardiovascular:      Rate and Rhythm: Normal rate and regular rhythm. Heart sounds: Normal heart sounds. Pulmonary:      Effort: Pulmonary effort is normal.      Breath sounds: Normal breath sounds.    Abdominal CT scanning was performed from the dome of the diaphragm to the pubic symphysis with non-ionic intravenous contrast material. Post contrast coronal MPR imaging was performed. Dose reduction techniques were used.  Dose information is transmitted to the ACR pm    Follow-up:  Matty Schafer MD  [place] [id number]  [phone]                Medications Prescribed:  Current Discharge Medication List    START taking these medications    ciprofloxacin 500 MG Oral Tab  Take 1 tablet

## 2021-08-29 NOTE — ED PROVIDER NOTES
Patient Seen in: [facility] [place]      History   Patient presents with:  Abdominal Pain    Stated Complaint: ABDOMINAL PAIN    HPI/Subjective:   HPI    Patient presents with abdominal pain.   The patient states that she went to [place] recently in no acute distress. HEENT: Normocephalic, atraumatic, pupils equal round and reactive to light. Neck: Supple. Cardiovascular: Regular rate and rhythm, no murmurs. Respiratory: Lungs clear to auscultation.   Abdomen: Soft, tender to palpation across th

## 2021-09-13 ENCOUNTER — OFFICE VISIT (OUTPATIENT)
Dept: PODIATRY CLINIC | Facility: CLINIC | Age: 56
End: 2021-09-13
Payer: COMMERCIAL

## 2021-09-13 DIAGNOSIS — B35.1 ONYCHOMYCOSIS: Primary | ICD-10-CM

## 2021-09-13 DIAGNOSIS — M79.674 PAIN OF TOE OF RIGHT FOOT: ICD-10-CM

## 2021-09-13 DIAGNOSIS — L60.0 ONYCHOCRYPTOSIS: ICD-10-CM

## 2021-09-13 PROCEDURE — 99213 OFFICE O/P EST LOW 20 MIN: CPT | Performed by: PODIATRIST

## 2021-09-15 NOTE — PROGRESS NOTES
Daniel Bridges is a 60year old female. Patient presents with:  Consult: right great toe nail pain was told might be a calus. HPI:   Patient returns the clinic complaining of continued pain in her left great toe thinks may be a callus at the tip. distress  EXTREMITIES:   1. Integument: The skin on the left hallux was evaluated the nail is slightly thickened.   There is hyperkeratosis that is built up slightly at the very distal tip of the toe where the nail meets the skin and this was trimmed and de

## 2021-10-15 ENCOUNTER — APPOINTMENT (OUTPATIENT)
Dept: FAMILY MEDICINE | Age: 56
End: 2021-10-15

## 2021-10-15 ENCOUNTER — IMMUNIZATION (OUTPATIENT)
Dept: FAMILY MEDICINE | Age: 56
End: 2021-10-15

## 2021-10-15 DIAGNOSIS — Z23 NEED FOR VACCINATION: Primary | ICD-10-CM

## 2021-10-15 PROCEDURE — 90471 IMMUNIZATION ADMIN: CPT

## 2021-10-15 PROCEDURE — 90686 IIV4 VACC NO PRSV 0.5 ML IM: CPT

## 2021-11-01 ENCOUNTER — OFFICE VISIT (OUTPATIENT)
Dept: PODIATRY CLINIC | Facility: CLINIC | Age: 56
End: 2021-11-01
Payer: COMMERCIAL

## 2021-11-01 VITALS — HEART RATE: 68 BPM | SYSTOLIC BLOOD PRESSURE: 124 MMHG | DIASTOLIC BLOOD PRESSURE: 76 MMHG

## 2021-11-01 DIAGNOSIS — B35.1 ONYCHOMYCOSIS: Primary | ICD-10-CM

## 2021-11-01 DIAGNOSIS — L60.0 ONYCHOCRYPTOSIS: ICD-10-CM

## 2021-11-01 DIAGNOSIS — M79.674 PAIN OF TOE OF RIGHT FOOT: ICD-10-CM

## 2021-11-01 PROCEDURE — 3078F DIAST BP <80 MM HG: CPT | Performed by: PODIATRIST

## 2021-11-01 PROCEDURE — 11750 EXCISION NAIL&NAIL MATRIX: CPT | Performed by: PODIATRIST

## 2021-11-01 PROCEDURE — 3074F SYST BP LT 130 MM HG: CPT | Performed by: PODIATRIST

## 2021-11-01 RX ORDER — AZITHROMYCIN 250 MG/1
TABLET, FILM COATED ORAL
Qty: 6 TABLET | Refills: 0 | Status: SHIPPED | OUTPATIENT
Start: 2021-11-01 | End: 2021-11-06

## 2021-11-01 NOTE — PROGRESS NOTES
Elaine Shown is a 60year old female. Patient presents with: Follow - Up: follow up on fungus possible P&A. pain 1/10 right great toe        HPI:   Patient returns the clinic for possible PNA still having pain in her right great toe.   At today's visit 124/76 (BP Location: Right arm, Patient Position: Sitting, Cuff Size: adult)   Pulse 68   GENERAL: well developed, well nourished, in no apparent distress  EXTREMITIES:   1.  Integument: Skin on her right foot was evaluated the medial lateral nail borders a 161 of full strength phenol using Pedinol phenol sticks. All areas that received phenol were then flushed with copious amounts of alcohol. The affected toes were dressed with antibiotic ointment gauze and a lightly applied Coban wrap for compression.   Rylan

## 2021-11-09 ENCOUNTER — OFFICE VISIT (OUTPATIENT)
Dept: PODIATRY CLINIC | Facility: CLINIC | Age: 56
End: 2021-11-09
Payer: COMMERCIAL

## 2021-11-09 DIAGNOSIS — Z98.890 POST-OPERATIVE STATE: Primary | ICD-10-CM

## 2021-11-09 PROCEDURE — 99024 POSTOP FOLLOW-UP VISIT: CPT | Performed by: PODIATRIST

## 2021-11-12 RX ORDER — TERBINAFINE HYDROCHLORIDE 250 MG/1
TABLET ORAL
Qty: 45 TABLET | Refills: 0 | Status: SHIPPED | OUTPATIENT
Start: 2021-11-12

## 2021-11-14 NOTE — PROGRESS NOTES
Annalisa Craft is a 60year old female. Patient presents with: Follow - Up: right ingrown grown nail follow up. denies pain in office today. HPI:   Patient is here for follow-up of right ingrown toenail she is not having any pain.   At today's visi no apparent distress  EXTREMITIES:   Nail edges are healing uneventfully medial and laterally on the right hallux.   ASSESSMENT AND PLAN:   Diagnoses and all orders for this visit:    Post-operative state        Plan: Patient continue to soak until all drai

## 2021-11-15 ENCOUNTER — TELEPHONE (OUTPATIENT)
Dept: PODIATRY CLINIC | Facility: CLINIC | Age: 56
End: 2021-11-15

## 2021-11-15 RX ORDER — DOXYCYCLINE HYCLATE 100 MG/1
100 CAPSULE ORAL 2 TIMES DAILY
Qty: 20 CAPSULE | Refills: 0 | Status: SHIPPED | OUTPATIENT
Start: 2021-11-15

## 2021-11-15 NOTE — TELEPHONE ENCOUNTER
S/w pt and she states the right hallux is still painful and swollen on both borders. She states the pain was the same at her appt last wk on 11/9/21, but she feels like the pain and swelling should be less by now and is concerned that its not improving. Danny Forman

## 2021-11-15 NOTE — TELEPHONE ENCOUNTER
Pt calling states toes still swelling and she has to put ice on them also states still throbbing please advise

## 2021-11-15 NOTE — TELEPHONE ENCOUNTER
Please call the patient and let her know that I have prescribed doxycycline to her pharmacy for period of 10 days tell her to continue with the soaks as well as the mupirocin ointment thank you

## 2021-11-15 NOTE — TELEPHONE ENCOUNTER
Called pt and notified her per Dr. Bhanu Valencia orders. She verbalized understanding and will CB if no improvement.

## 2021-11-29 ENCOUNTER — OFFICE VISIT (OUTPATIENT)
Dept: PODIATRY CLINIC | Facility: CLINIC | Age: 56
End: 2021-11-29
Payer: COMMERCIAL

## 2021-11-29 DIAGNOSIS — Z98.890 POST-OPERATIVE STATE: Primary | ICD-10-CM

## 2021-11-29 PROCEDURE — 99212 OFFICE O/P EST SF 10 MIN: CPT | Performed by: PODIATRIST

## 2021-12-03 NOTE — PROGRESS NOTES
Mauro Lynn is a 60year old female. Patient presents with: Follow - Up: Right great toe ingrown nail f/u, pt states not doing better still painful and has little swelling.         HPI:   Patient returns to the clinic at today's date still complaining and denies heartburn  NEURO: denies headaches    EXAM:   There were no vitals taken for this visit.   GENERAL: well developed, well nourished, in no apparent distress  EXTREMITIES:   The PNA sites are healing uneventfully I thinned out the toenail with a bu

## 2021-12-17 ENCOUNTER — LAB ENCOUNTER (OUTPATIENT)
Dept: LAB | Age: 56
End: 2021-12-17
Attending: INTERNAL MEDICINE
Payer: COMMERCIAL

## 2021-12-17 DIAGNOSIS — E11.9 TYPE 2 DIABETES MELLITUS WITHOUT COMPLICATION, WITHOUT LONG-TERM CURRENT USE OF INSULIN (HCC): ICD-10-CM

## 2021-12-17 DIAGNOSIS — M19.90 ARTHRITIS: ICD-10-CM

## 2021-12-17 DIAGNOSIS — E03.9 ACQUIRED HYPOTHYROIDISM: ICD-10-CM

## 2021-12-17 PROCEDURE — 84443 ASSAY THYROID STIM HORMONE: CPT

## 2021-12-17 PROCEDURE — 80053 COMPREHEN METABOLIC PANEL: CPT

## 2021-12-17 PROCEDURE — 86376 MICROSOMAL ANTIBODY EACH: CPT

## 2021-12-17 PROCEDURE — 84481 FREE ASSAY (FT-3): CPT

## 2021-12-17 PROCEDURE — 80061 LIPID PANEL: CPT

## 2021-12-17 PROCEDURE — 86038 ANTINUCLEAR ANTIBODIES: CPT

## 2021-12-17 PROCEDURE — 84439 ASSAY OF FREE THYROXINE: CPT

## 2021-12-17 PROCEDURE — 36415 COLL VENOUS BLD VENIPUNCTURE: CPT

## 2021-12-17 PROCEDURE — 83036 HEMOGLOBIN GLYCOSYLATED A1C: CPT

## 2021-12-23 ENCOUNTER — HOSPITAL ENCOUNTER (OUTPATIENT)
Age: 56
Discharge: HOME | End: 2021-12-23
Attending: EMERGENCY MEDICINE
Payer: COMMERCIAL

## 2021-12-23 VITALS
RESPIRATION RATE: 12 BRPM | HEIGHT: 62 IN | BODY MASS INDEX: 34.96 KG/M2 | TEMPERATURE: 99 F | WEIGHT: 190 LBS | HEART RATE: 74 BPM

## 2021-12-23 DIAGNOSIS — M53.3 BACK PAIN, SACROILIAC: Primary | ICD-10-CM

## 2021-12-23 DIAGNOSIS — B34.9 VIRAL INFECTION: ICD-10-CM

## 2021-12-23 PROCEDURE — 99213 OFFICE O/P EST LOW 20 MIN: CPT

## 2021-12-23 NOTE — ED PROVIDER NOTES
Patient Seen in: [facility] [place]      History   No chief complaint on file. Stated Complaint: pt has lower back pain , sinus headache      Subjective:   HPI    57-year-old woman comes in with right SI pain and tenderness.   She says she has diagnosis before testing included sacroiliac strain versus sciatica (unlikely)  I have discussed with the patient the results of test, differential diagnosis, and treatment plan.  They expressed clear understanding of these instructions and agrees to the pl

## 2021-12-23 NOTE — ED INITIAL ASSESSMENT (HPI)
Patient was seen by Dr. Alejandro Iverson for COVID testing. Tech swabbed the patient for COVID testing. Patient was not seen by the nurse, as Dr. Alejandro Iverson said it was not needed - only COVID testing. Please see Dr. Katrina Ellsworth detailed note for exam and evauation.

## 2022-02-21 ENCOUNTER — OFFICE VISIT (OUTPATIENT)
Dept: PODIATRY CLINIC | Facility: CLINIC | Age: 57
End: 2022-02-21
Payer: COMMERCIAL

## 2022-02-21 DIAGNOSIS — M79.674 PAIN OF TOE OF RIGHT FOOT: Primary | ICD-10-CM

## 2022-02-21 PROCEDURE — 99212 OFFICE O/P EST SF 10 MIN: CPT | Performed by: PODIATRIST

## 2022-06-14 RX ORDER — DOXYCYCLINE HYCLATE 100 MG/1
CAPSULE ORAL
Qty: 20 CAPSULE | Refills: 0 | OUTPATIENT
Start: 2022-06-14

## 2022-06-20 ENCOUNTER — HOSPITAL ENCOUNTER (OUTPATIENT)
Age: 57
Discharge: HOME | End: 2022-06-20
Payer: COMMERCIAL

## 2022-06-20 VITALS
HEIGHT: 62 IN | SYSTOLIC BLOOD PRESSURE: 143 MMHG | WEIGHT: 195 LBS | DIASTOLIC BLOOD PRESSURE: 75 MMHG | RESPIRATION RATE: 18 BRPM | BODY MASS INDEX: 35.88 KG/M2 | HEART RATE: 80 BPM | TEMPERATURE: 97 F | OXYGEN SATURATION: 100 %

## 2022-06-20 DIAGNOSIS — L30.9 DERMATITIS: Primary | ICD-10-CM

## 2022-06-20 PROCEDURE — 99213 OFFICE O/P EST LOW 20 MIN: CPT

## 2022-06-20 RX ORDER — DULOXETIN HYDROCHLORIDE 30 MG/1
30 CAPSULE, DELAYED RELEASE ORAL DAILY
COMMUNITY

## 2022-06-20 RX ORDER — FAMOTIDINE 20 MG/1
20 TABLET, FILM COATED ORAL 2 TIMES DAILY PRN
Qty: 30 TABLET | Refills: 0 | Status: SHIPPED | OUTPATIENT
Start: 2022-06-20 | End: 2022-07-20

## 2022-06-20 RX ORDER — PREDNISONE 20 MG/1
TABLET ORAL
Qty: 21 TABLET | Refills: 0 | Status: SHIPPED | OUTPATIENT
Start: 2022-06-20 | End: 2022-07-02

## 2022-06-20 NOTE — ED INITIAL ASSESSMENT (HPI)
Pt noticed rash to body since yesterday. Denies any change of soaps/detergents. Itching to area. Has been taking zyrtec.

## 2022-12-07 ENCOUNTER — LAB ENCOUNTER (OUTPATIENT)
Dept: LAB | Age: 57
End: 2022-12-07
Attending: INTERNAL MEDICINE
Payer: COMMERCIAL

## 2022-12-07 DIAGNOSIS — R73.03 PREDIABETES: ICD-10-CM

## 2022-12-07 DIAGNOSIS — R63.5 WEIGHT GAIN: ICD-10-CM

## 2022-12-07 LAB
ALBUMIN SERPL-MCNC: 3.2 G/DL (ref 3.4–5)
ALBUMIN/GLOB SERPL: 0.8 {RATIO} (ref 1–2)
ALP LIVER SERPL-CCNC: 72 U/L
ALT SERPL-CCNC: 33 U/L
ANION GAP SERPL CALC-SCNC: 3 MMOL/L (ref 0–18)
AST SERPL-CCNC: 16 U/L (ref 15–37)
BILIRUB SERPL-MCNC: 0.2 MG/DL (ref 0.1–2)
BUN BLD-MCNC: 11 MG/DL (ref 7–18)
CALCIUM BLD-MCNC: 9 MG/DL (ref 8.5–10.1)
CHLORIDE SERPL-SCNC: 112 MMOL/L (ref 98–112)
CHOLEST SERPL-MCNC: 126 MG/DL (ref ?–200)
CO2 SERPL-SCNC: 27 MMOL/L (ref 21–32)
CREAT BLD-MCNC: 0.74 MG/DL
EST. AVERAGE GLUCOSE BLD GHB EST-MCNC: 137 MG/DL (ref 68–126)
FASTING PATIENT LIPID ANSWER: YES
FASTING STATUS PATIENT QL REPORTED: YES
GFR SERPLBLD BASED ON 1.73 SQ M-ARVRAT: 93 ML/MIN/1.73M2 (ref 60–?)
GLOBULIN PLAS-MCNC: 4.2 G/DL (ref 2.8–4.4)
GLUCOSE BLD-MCNC: 105 MG/DL (ref 70–99)
HBA1C MFR BLD: 6.4 % (ref ?–5.7)
HDLC SERPL-MCNC: 57 MG/DL (ref 40–59)
LDLC SERPL CALC-MCNC: 59 MG/DL (ref ?–100)
NONHDLC SERPL-MCNC: 69 MG/DL (ref ?–130)
OSMOLALITY SERPL CALC.SUM OF ELEC: 294 MOSM/KG (ref 275–295)
POTASSIUM SERPL-SCNC: 4.2 MMOL/L (ref 3.5–5.1)
PROT SERPL-MCNC: 7.4 G/DL (ref 6.4–8.2)
SODIUM SERPL-SCNC: 142 MMOL/L (ref 136–145)
T4 FREE SERPL-MCNC: 0.9 NG/DL (ref 0.8–1.7)
TRIGL SERPL-MCNC: 36 MG/DL (ref 30–149)
TSI SER-ACNC: 3.43 MIU/ML (ref 0.36–3.74)
VLDLC SERPL CALC-MCNC: 5 MG/DL (ref 0–30)

## 2022-12-07 PROCEDURE — 80061 LIPID PANEL: CPT

## 2022-12-07 PROCEDURE — 84443 ASSAY THYROID STIM HORMONE: CPT

## 2022-12-07 PROCEDURE — 83036 HEMOGLOBIN GLYCOSYLATED A1C: CPT

## 2022-12-07 PROCEDURE — 84439 ASSAY OF FREE THYROXINE: CPT

## 2022-12-07 PROCEDURE — 36415 COLL VENOUS BLD VENIPUNCTURE: CPT

## 2022-12-07 PROCEDURE — 80053 COMPREHEN METABOLIC PANEL: CPT

## 2022-12-23 ENCOUNTER — APPOINTMENT (OUTPATIENT)
Dept: GENERAL RADIOLOGY | Age: 57
End: 2022-12-23
Attending: PHYSICIAN ASSISTANT
Payer: COMMERCIAL

## 2022-12-23 ENCOUNTER — APPOINTMENT (OUTPATIENT)
Dept: ULTRASOUND IMAGING | Age: 57
End: 2022-12-23
Attending: PHYSICIAN ASSISTANT
Payer: COMMERCIAL

## 2022-12-23 ENCOUNTER — HOSPITAL ENCOUNTER (OUTPATIENT)
Age: 57
Discharge: HOME | End: 2022-12-23
Payer: COMMERCIAL

## 2022-12-23 VITALS
SYSTOLIC BLOOD PRESSURE: 146 MMHG | HEART RATE: 60 BPM | HEIGHT: 62 IN | WEIGHT: 210 LBS | DIASTOLIC BLOOD PRESSURE: 65 MMHG | RESPIRATION RATE: 18 BRPM | OXYGEN SATURATION: 96 % | TEMPERATURE: 98 F | BODY MASS INDEX: 38.64 KG/M2

## 2022-12-23 DIAGNOSIS — R09.81 NASAL SINUS CONGESTION: ICD-10-CM

## 2022-12-23 DIAGNOSIS — M25.462 EFFUSION OF LEFT KNEE: Primary | ICD-10-CM

## 2022-12-23 PROCEDURE — 96372 THER/PROPH/DIAG INJ SC/IM: CPT

## 2022-12-23 PROCEDURE — 99214 OFFICE O/P EST MOD 30 MIN: CPT

## 2022-12-23 PROCEDURE — 73560 X-RAY EXAM OF KNEE 1 OR 2: CPT | Performed by: PHYSICIAN ASSISTANT

## 2022-12-23 PROCEDURE — 93971 EXTREMITY STUDY: CPT | Performed by: PHYSICIAN ASSISTANT

## 2022-12-23 RX ORDER — KETOROLAC TROMETHAMINE 30 MG/ML
30 INJECTION, SOLUTION INTRAMUSCULAR; INTRAVENOUS ONCE
Status: COMPLETED | OUTPATIENT
Start: 2022-12-23 | End: 2022-12-23

## 2022-12-23 RX ORDER — DEXAMETHASONE 4 MG/1
16 TABLET ORAL ONCE
Status: COMPLETED | OUTPATIENT
Start: 2022-12-23 | End: 2022-12-23

## 2022-12-23 RX ORDER — METHYLPREDNISOLONE 4 MG/1
TABLET ORAL
Qty: 1 EACH | Refills: 0 | Status: SHIPPED | OUTPATIENT
Start: 2022-12-23

## 2022-12-23 NOTE — ED INITIAL ASSESSMENT (HPI)
Pt c/o sinus pressure after flying with ear pain, pt just getting over a cold, pt also c/o L knee pain c/o of feeling a lump to the side of the L calf, pt denies injury

## 2022-12-23 NOTE — DISCHARGE INSTRUCTIONS
Please return to the ER/clinic if symptoms worsen. Follow-up with your PCP in 24-48 hours as needed. The Decadron will work in your system the next several days. We will give you a Medrol dose pack to start on day 2 or 3 if symptoms persist.  Recommend taking naproxen twice daily with food. Wear the Ace wrap as provided. Also recommend picking up a knee sleeve. Make a follow-up appointment with orthopedics for further evaluation and treatment.

## 2023-03-06 ENCOUNTER — ORDER TRANSCRIPTION (OUTPATIENT)
Dept: PHYSICAL THERAPY | Facility: HOSPITAL | Age: 58
End: 2023-03-06

## 2023-03-06 DIAGNOSIS — S83.242A ACUTE MEDIAL MENISCUS TEAR OF LEFT KNEE: Primary | ICD-10-CM

## 2023-04-19 ENCOUNTER — TELEPHONE (OUTPATIENT)
Dept: PHYSICAL THERAPY | Facility: HOSPITAL | Age: 58
End: 2023-04-19

## 2023-04-21 ENCOUNTER — TELEPHONE (OUTPATIENT)
Dept: PHYSICAL THERAPY | Facility: HOSPITAL | Age: 58
End: 2023-04-21

## 2023-04-26 ENCOUNTER — OFFICE VISIT (OUTPATIENT)
Dept: PHYSICAL THERAPY | Age: 58
End: 2023-04-26
Attending: ORTHOPAEDIC SURGERY
Payer: COMMERCIAL

## 2023-04-26 DIAGNOSIS — S83.242A ACUTE MEDIAL MENISCUS TEAR OF LEFT KNEE: ICD-10-CM

## 2023-04-26 PROCEDURE — 97161 PT EVAL LOW COMPLEX 20 MIN: CPT | Performed by: PHYSICAL THERAPIST

## 2023-04-26 PROCEDURE — 97110 THERAPEUTIC EXERCISES: CPT | Performed by: PHYSICAL THERAPIST

## 2023-04-27 ENCOUNTER — TELEPHONE (OUTPATIENT)
Dept: PHYSICAL THERAPY | Facility: HOSPITAL | Age: 58
End: 2023-04-27

## 2023-05-03 ENCOUNTER — OFFICE VISIT (OUTPATIENT)
Dept: PHYSICAL THERAPY | Age: 58
End: 2023-05-03
Attending: ORTHOPAEDIC SURGERY
Payer: COMMERCIAL

## 2023-05-03 PROCEDURE — 97110 THERAPEUTIC EXERCISES: CPT | Performed by: PHYSICAL THERAPIST

## 2023-05-03 NOTE — PROGRESS NOTES
Dx: Acute medial meniscus tear of left knee (L62.073V)             Authorized # of Visits:  8  Fall Risk: standard         Precautions: n/a             Subjective:   Pain at front and back of the knee  Current Pain Ratin/10  Objective:   No gait devaition  HEP additions as noted  Reviewed joint loading/protection principles   Lateral step up/down on 4 inch step - no pain, mild pain on 6 inch step     Assessment:   At onset of session left medial knee pain present. Patient able to appreciate decreased pain with increased reps a pain free joint loading    Plan:   PT 2x/wk progressive exercise as tolerated     Date: 5/3/2023  Tx#:  Date: Tx#: 3/ Date: Tx#: 4/ Date: Tx#: 5/ Date: Tx#: / Date: Tx#: 7/ Date: Tx#: 8/   TherEx TherEx TherEx TherEx TherEx TherEx TherEx   Bike L3 8 minutes          Hamstring stretch x 3 30 seconds (HEP)         Gastroc/soleus stretch 30 seconds x 2 each (HEP)         Shuttle 3 bands low position ankle mobility > 30 reps          Shuttle 4 bands squats 2 minutes          Short arc quads > 20 reps with 2 pounds          SLR 20 reps each          Lateral step up/down on 4 inch step 10 reps each         Lateral step up/down on 6 inch step 10 reps each              Charges:  TherEx 3        Total Timed Treatment: 45  min  Total Treatment Time: 45 min

## 2023-05-04 NOTE — PROGRESS NOTES
Dx: Acute medial meniscus tear of left knee (V33.175S)             Authorized # of Visits:  8  Fall Risk: standard         Precautions: n/a             Subjective:   Pain at anterior knee today. She still does get pain behind the knee as well. She has an elliptical at home she looks forward to getting back to. Current Pain Rating: 3/10  Objective:   Pt is 3 wks postop today Lf knee PMM 5-5-23  Mild swelling noted deep to surgical portals today; incisions C/D/I  Advised pt at this stage of healing, to gradually incr walking tolerance before she considers progressing to elliptical. Ice 10-15 min x2/day (teresa after activity) to control swelling and pain. Pain c attempt at 6\" lateral step up, so did 4\" only    Assessment:   Pt presents today c mild swelling deep to incision sites. She tolerated several new ex's to promote incr LLE strength and initiate balance activities. She had no incr pain but difficulty noted c static and dynamic balance. Held 6\" lateral stepups today 2o pain; she tolerated 4\" s incr pain. Cont to address deficits to work toward [employer] and set goals. [place] pt ice 10-15 min x2/day to decr swelling and pain control. Plan:   PT 2x/wk progressive exercise as tolerated     Date: 5/3/2023  Tx#: 2/8 Date: 5-5-23  Tx#: 3/8 Date: Tx#: 4/ Date: Tx#: 5/ Date: Tx#: 6/ Date: Tx#: 7/ Date:    Tx#: 8/   TherEx TherEx TherEx TherEx TherEx TherEx TherEx   Bike L3 8 minutes  Bike L3 8 minutes        Hamstring stretch x 3 30 seconds (HEP) Hamstring stretch x 3 30 seconds supine c strap (HEP)        Gastroc/soleus stretch 30 seconds x 2 each (HEP) Gastroc/soleus stretch 30 seconds x 2 each (HEP)        Shuttle 3 bands low position ankle mobility > 30 reps  Shuttle 3 bands low position ankle mobility > 30 reps         Shuttle 4 bands squats 2 minutes  Shuttle 4 bands squats 2 minutes         Short arc quads > 20 reps with 2 pounds  Short arc quads 5\"x20 reps with 2 pounds         SLR 20 reps each  SLR 20 reps ea flexion, abduction, adduction, extension        Lateral step up/down on 4 inch step 10 reps each Lateral step up/down on 4 inch step 2x10 reps each        Lateral step up/down on 6 inch step 10 reps each           Tandem balance 1 min ea         1/2 FR balance 2min         Standing on airex c alt 4\" taps 2min            Charges:  TherEx 3        Total Timed Treatment: 40  min  Total Treatment Time: 40 min

## 2023-05-05 ENCOUNTER — OFFICE VISIT (OUTPATIENT)
Dept: PHYSICAL THERAPY | Age: 58
End: 2023-05-05
Attending: ORTHOPAEDIC SURGERY
Payer: COMMERCIAL

## 2023-05-05 DIAGNOSIS — M25.562 ACUTE POSTOPERATIVE PAIN OF LEFT KNEE: Primary | ICD-10-CM

## 2023-05-05 DIAGNOSIS — G89.18 ACUTE POSTOPERATIVE PAIN OF LEFT KNEE: Primary | ICD-10-CM

## 2023-05-05 PROCEDURE — 97110 THERAPEUTIC EXERCISES: CPT

## 2023-05-08 ENCOUNTER — OFFICE VISIT (OUTPATIENT)
Dept: PHYSICAL THERAPY | Age: 58
End: 2023-05-08
Attending: ORTHOPAEDIC SURGERY
Payer: COMMERCIAL

## 2023-05-08 PROCEDURE — 97110 THERAPEUTIC EXERCISES: CPT | Performed by: PHYSICAL THERAPIST

## 2023-05-08 NOTE — PROGRESS NOTES
Dx: Acute medial meniscus tear of left knee (L21.361I)             Authorized # of Visits:  8  Fall Risk: standard         Precautions: n/a             Subjective:     Current Pain Rating: 3/10  Objective:   No gait deviation. Left medial pain with loading on step and single leg balance persists   Max reps test on shuttle with 3 bands 25 reps each    Assessment:   Patient tolerated increase joint loading on shuttle. She is progressing but is limited in tolerance in loading     Plan:   PT 2x/wk progressive exercise as tolerated     Date: 5/3/2023  Tx#: 2/8 Date: 5-5-23  Tx#: 3/8 Date: 5/8/2023  Tx#: 4/8 Date: Tx#: 5/ Date: Tx#: 6/ Date: Tx#: 7/ Date:    Tx#: 8/   TherEx TherEx TherEx TherEx TherEx TherEx TherEx   Bike L3 8 minutes  Bike L3 8 minutes L6 8 minutes       Hamstring stretch x 3 30 seconds (HEP) Hamstring stretch x 3 30 seconds supine c strap (HEP) Lateral step up/down 6 inch step 10 reps - left medial knee pain        Gastroc/soleus stretch 30 seconds x 2 each (HEP) Gastroc/soleus stretch 30 seconds x 2 each (HEP) 4 inch step left medial knee pain        Shuttle 3 bands low position ankle mobility > 30 reps  Shuttle 3 bands low position ankle mobility > 30 reps  Shuttle 5 bands squats 2 minutes        Shuttle 4 bands squats 2 minutes  Shuttle 4 bands squats 2 minutes  Shuttle 3 bands single leg squats   Right  = 25  Left = 25       Short arc quads > 20 reps with 2 pounds  Short arc quads 5\"x20 reps with 2 pounds  Bilat heel raises 20 reps with 3 second hold         SLR 20 reps each  SLR 20 reps ea flexion, abduction, adduction, extension Prostretch 3 reps x 30 seconds each        Lateral step up/down on 4 inch step 10 reps each Lateral step up/down on 4 inch step 2x10 reps each Clam shells right = 40 reps   Left = 50       Lateral step up/down on 6 inch step 10 reps each   Bridge 20 reps         Tandem balance 1 min ea Single leg balance with alternate overhead reaches 5 reps x 15 seconds each 1/2 FR balance 2min Single leg balance with opposite leg anterior reach - left knee pain decreased tolerance for knee flexion         Standing on airex c alt 4\" taps 2min Medicordz resisted forward/back 5  Reps each            Charges:  TherEx 3        Total Timed Treatment: 50  min  Total Treatment Time: 50 min

## 2023-05-10 ENCOUNTER — APPOINTMENT (OUTPATIENT)
Dept: PHYSICAL THERAPY | Age: 58
End: 2023-05-10
Attending: ORTHOPAEDIC SURGERY
Payer: COMMERCIAL

## 2023-05-15 ENCOUNTER — OFFICE VISIT (OUTPATIENT)
Dept: PHYSICAL THERAPY | Age: 58
End: 2023-05-15
Attending: ORTHOPAEDIC SURGERY
Payer: COMMERCIAL

## 2023-05-15 PROCEDURE — 97110 THERAPEUTIC EXERCISES: CPT | Performed by: PHYSICAL THERAPIST

## 2023-05-15 NOTE — PROGRESS NOTES
Dx: Acute medial meniscus tear of left knee (O24.745R)             Authorized # of Visits:  8  Fall Risk: standard         Precautions: n/a             Subjective:     Current Pain Ratin/10  Objective:   No gait deviation  Right and left knee flexion symmetrical. There is quad tightness with quad stretch - no knee pain     Assessment:   Patient is making good progress towards therapy goals  Mild left knee joint effusion persists     Plan:   PT 2x/wk progressive exercise as tolerated     Date: 5/3/2023  Tx#:  Date: 23  Tx#: 3/8 Date: 2023  Tx#:  Date: 5/15/2023  Tx#:  Date: Tx#: / Date: Tx#:  Date:    Tx#: /   TherEx TherEx TherEx TherEx TherEx TherEx TherEx   Bike L3 8 minutes  Bike L3 8 minutes L6 8 minutes L4 10 minutes      Hamstring stretch x 3 30 seconds (HEP) Hamstring stretch x 3 30 seconds supine c strap (HEP) Lateral step up/down 6 inch step 10 reps - left medial knee pain  TRX squats 10 reps x 1       Gastroc/soleus stretch 30 seconds x 2 each (HEP) Gastroc/soleus stretch 30 seconds x 2 each (HEP) 4 inch step left medial knee pain  Shuttle 4 bands squats reps to fatigue x 3       Shuttle 3 bands low position ankle mobility > 30 reps  Shuttle 3 bands low position ankle mobility > 30 reps  Shuttle 5 bands squats 2 minutes  Short arc quad 2 pounds 10 reps, 5 pounds > 20 reps       Shuttle 4 bands squats 2 minutes  Shuttle 4 bands squats 2 minutes  Shuttle 3 bands single leg squats   Right  = 25  Left = 25 SLR with hip abd/add 10 reps x 3 each       Short arc quads > 20 reps with 2 pounds  Short arc quads 5\"x20 reps with 2 pounds  Bilat heel raises 20 reps with 3 second hold   Prone quad stretch 4 reps x 20 seconds each         SLR 20 reps each      SLR 20 reps ea flexion, abduction, adduction, extension Prostretch 3 reps x 30 seconds each  Prone hip extension 10 reps x 2 each      Lateral step up/down on 4 inch step 10 reps each Lateral step up/down on 4 inch step 2x10 reps each Clam shells right = 40 reps   Left = 50 Forward step with bilat same side reach 10 reps each       Lateral step up/down on 6 inch step 10 reps each   Bridge 20 reps  Forward step with bilat reach to knee level 10 reps each       Tandem balance 1 min ea Single leg balance with alternate overhead reaches 5 reps x 15 seconds each         1/2 FR balance 2min Single leg balance with opposite leg anterior reach - left knee pain decreased tolerance for knee flexion         Standing on airex c alt 4\" taps 2min Medicordz resisted forward/back 5  Reps each            Charges:  TherEx 3        Total Timed Treatment: 50  min  Total Treatment Time: 50 min

## 2023-05-17 ENCOUNTER — OFFICE VISIT (OUTPATIENT)
Dept: PHYSICAL THERAPY | Age: 58
End: 2023-05-17
Attending: ORTHOPAEDIC SURGERY
Payer: COMMERCIAL

## 2023-05-17 PROCEDURE — 97110 THERAPEUTIC EXERCISES: CPT | Performed by: PHYSICAL THERAPIST

## 2023-05-17 NOTE — PROGRESS NOTES
Dx: Acute medial meniscus tear of left knee (R65.459V)             Authorized # of Visits:  8  Fall Risk: standard         Precautions: n/a             Subjective:   Patient states she has some knee stiffness with prolonged sitting. Current Pain Ratin/10  Objective:   Treatment as noted      Assessment:   Patient tolerated progression to elliptical quite well. Discussed progress with patient. She feels ready for discharge after next session. In agreement with POC    Plan:   PT one more session     Date: 5/3/2023  Tx#:  Date: 23  Tx#: 3/8 Date: 2023  Tx#:  Date: 5/15/2023  Tx#:  Date: 2023  Tx#:  Date: Tx#:  Date:    Tx#: /   TherEx TherEx TherEx TherEx TherEx TherEx TherEx   Bike L3 8 minutes  Bike L3 8 minutes L6 8 minutes L4 10 minutes Elliptical 8 minutes      Hamstring stretch x 3 30 seconds (HEP) Hamstring stretch x 3 30 seconds supine c strap (HEP) Lateral step up/down 6 inch step 10 reps - left medial knee pain  TRX squats 10 reps x 1  Shuttle 5 bands squats reps to fatigue x 2      Gastroc/soleus stretch 30 seconds x 2 each (HEP) Gastroc/soleus stretch 30 seconds x 2 each (HEP) 4 inch step left medial knee pain  Shuttle 4 bands squats reps to fatigue x 3  Shuttle 3 bands single leg squats      Shuttle 3 bands low position ankle mobility > 30 reps  Shuttle 3 bands low position ankle mobility > 30 reps  Shuttle 5 bands squats 2 minutes  Short arc quad 2 pounds 10 reps, 5 pounds > 20 reps  Knees to chest with SB 30 reps      Shuttle 4 bands squats 2 minutes  Shuttle 4 bands squats 2 minutes  Shuttle 3 bands single leg squats   Right  = 25  Left = 25 SLR with hip abd/add 10 reps x 3 each  Single leg balance opposite leg swings across midline 10 reps x 2 each      Short arc quads > 20 reps with 2 pounds  Short arc quads 5\"x20 reps with 2 pounds  Bilat heel raises 20 reps with 3 second hold   Prone quad stretch 4 reps x 20 seconds each    Single leg balance with posterior reach 10 reps x 2          SLR 20 reps each      SLR 20 reps ea flexion, abduction, adduction, extension Prostretch 3 reps x 30 seconds each  Prone hip extension 10 reps x 2 each Medicorz resisted forward, side step     Lateral step up/down on 4 inch step 10 reps each Lateral step up/down on 4 inch step 2x10 reps each Clam shells right = 40 reps   Left = 50 Forward step with bilat same side reach 10 reps each  Hip hinge training      Lateral step up/down on 6 inch step 10 reps each   Bridge 20 reps  Forward step with bilat reach to knee level 10 reps each       Tandem balance 1 min ea Single leg balance with alternate overhead reaches 5 reps x 15 seconds each         1/2 FR balance 2min Single leg balance with opposite leg anterior reach - left knee pain decreased tolerance for knee flexion         Standing on airex c alt 4\" taps 2min Medicordz resisted forward/back 5  Reps each            Charges:  TherEx 3        Total Timed Treatment: 45  min  Total Treatment Time:  45 miin

## 2023-05-22 ENCOUNTER — APPOINTMENT (OUTPATIENT)
Dept: PHYSICAL THERAPY | Age: 58
End: 2023-05-22
Attending: ORTHOPAEDIC SURGERY
Payer: COMMERCIAL

## 2023-10-07 ENCOUNTER — HOSPITAL ENCOUNTER (OUTPATIENT)
Age: 58
Discharge: HOME | End: 2023-10-07

## 2023-10-07 VITALS
OXYGEN SATURATION: 97 % | RESPIRATION RATE: 20 BRPM | DIASTOLIC BLOOD PRESSURE: 89 MMHG | WEIGHT: 195 LBS | TEMPERATURE: 97 F | BODY MASS INDEX: 35.88 KG/M2 | HEIGHT: 62 IN | SYSTOLIC BLOOD PRESSURE: 142 MMHG | HEART RATE: 74 BPM

## 2023-10-07 DIAGNOSIS — M79.601 PAIN OF RIGHT UPPER EXTREMITY: ICD-10-CM

## 2023-10-07 DIAGNOSIS — H02.89 IRRITATION OF EYELID: Primary | ICD-10-CM

## 2023-10-07 PROCEDURE — 99213 OFFICE O/P EST LOW 20 MIN: CPT

## 2023-10-07 RX ORDER — PREDNISONE 20 MG/1
40 TABLET ORAL DAILY
Qty: 10 TABLET | Refills: 0 | Status: SHIPPED | OUTPATIENT
Start: 2023-10-07 | End: 2023-10-12

## 2023-10-07 RX ORDER — ERYTHROMYCIN 5 MG/G
1 OINTMENT OPHTHALMIC EVERY 6 HOURS
Qty: 1 EACH | Refills: 0 | Status: SHIPPED | OUTPATIENT
Start: 2023-10-07 | End: 2023-10-14

## 2023-10-07 NOTE — DISCHARGE INSTRUCTIONS
Use the antibiotic ointment to the left eye. Warm compresses to your eye every 2-4 hours for 10 to 15 minutes. Take the steroid as directed. Follow-up with ophthalmology.

## 2023-10-07 NOTE — ED INITIAL ASSESSMENT (HPI)
Left eye - pain x 2 days. Denies any injury. Pt applies pataday. Also c/o itching , crusty today. Right shoulder/arm pain -   x 2-3 days. Denies any injury. Took acyclovir   started yesterday. States feels shingles to her.   Denies any rash

## 2024-09-19 ENCOUNTER — OFFICE VISIT (OUTPATIENT)
Dept: FAMILY MEDICINE CLINIC | Facility: CLINIC | Age: 59
End: 2024-09-19
Payer: COMMERCIAL

## 2024-09-19 VITALS
HEIGHT: 62 IN | RESPIRATION RATE: 16 BRPM | DIASTOLIC BLOOD PRESSURE: 72 MMHG | HEART RATE: 82 BPM | WEIGHT: 183 LBS | OXYGEN SATURATION: 96 % | BODY MASS INDEX: 33.68 KG/M2 | TEMPERATURE: 97 F | SYSTOLIC BLOOD PRESSURE: 112 MMHG

## 2024-09-19 DIAGNOSIS — L84 CALLUS OF TOE: Primary | ICD-10-CM

## 2024-09-19 PROCEDURE — 3078F DIAST BP <80 MM HG: CPT | Performed by: NURSE PRACTITIONER

## 2024-09-19 PROCEDURE — 3074F SYST BP LT 130 MM HG: CPT | Performed by: NURSE PRACTITIONER

## 2024-09-19 PROCEDURE — 3008F BODY MASS INDEX DOCD: CPT | Performed by: NURSE PRACTITIONER

## 2024-09-19 PROCEDURE — 99202 OFFICE O/P NEW SF 15 MIN: CPT | Performed by: NURSE PRACTITIONER

## 2024-09-19 NOTE — PROGRESS NOTES
CHIEF COMPLAINT:     Chief Complaint   Patient presents with    Foot Injury     Boil to L foot, pinky, painful throbbing feeling  Pain started on Sunday   Warm compress       HPI:     Shruthi Case is a 59 year old female who presents with concerns of a sore to Left lateral 5th toe. Patient states symptoms present 4  days.  Reports erythema, some tenderness to palpation, and no drainage from area.  Symptoms have been consistent since onset.  Affected location includes: left lateral aspect of 5th toe.    Precipitating event: patient was wearing tight sandals.  Treatments: warm compress.  Associated symptoms include: none.  Denies fever, streaking of wound, or other signs of systemic illness.       Current Outpatient Medications   Medication Sig Dispense Refill    methylPREDNISolone (MEDROL) 4 MG Oral Tablet Therapy Pack Dosepack: take as directed Start on day 2-3 if symptoms persist 1 each 0    Elastic Bandages & Supports (KNEE COMPRESSION SLEEVE/L/XL) Does not apply Misc 1 Application. daily as needed. 1 each 0    Hydroquinone 4 % External Cream Apply 1 Application topically 2 (two) times daily. Apply to hyperpigmented areas twice daily 28.35 g 5    DULoxetine (CYMBALTA) 30 MG Oral Cap DR Particles Take 1 capsule (30 mg total) by mouth daily.        Past Medical History:    Depression    History of shingles      Social History:  Social History     Socioeconomic History    Marital status:    Tobacco Use    Smoking status: Never    Smokeless tobacco: Never   Vaping Use    Vaping status: Never Used   Substance and Sexual Activity    Alcohol use: No     Alcohol/week: 0.0 standard drinks of alcohol    Drug use: No     Social Determinants of Health      Received from [facility]        REVIEW OF SYSTEMS:   GENERAL: feels well otherwise, no fever, no chills.  SKIN: as above.  CHEST: no chest pains, no palpitations.  LUNGS: denies shortness of breath with exertion or rest. No wheezing, no  cough.  LYMPH: no enlargement of the lymph nodes.  MUSC/SKEL: no joint swelling, no joint stiffness.  NEURO: no abnormal sensation, no tingling of the skin or numbness.    EXAM:   /72   Pulse 82   Temp 97.2 °F (36.2 °C)   Resp 16   Ht 5' 2\" (1.575 m)   Wt 183 lb (83 kg)   SpO2 96%   BMI 33.47 kg/m²   GENERAL: well developed, well nourished,in no apparent distress  SKIN: callused area to lateral aspect of left 5th toe.  No erythema, no fluid, minimal swelling  HEAD: atraumatic, normocephalic  EYES: conjunctiva clear, EOM intact  NOSE: Normal external nose.  No rhinorrhea.  NECK: supple, non-tender  LUNGS: clear to auscultation bilaterally, no wheezes or rhonchi. Breathing is non labored.  CARDIO: RRR without murmur  EXTREMITIES: no cyanosis, clubbing or edema.  Cap refill brisk- less than 2 seconds.   LYMPH: no lymphadenopathy.      ASSESSMENT AND PLAN:     ASSESSMENT:  Encounter Diagnosis   Name Primary?    Callus of toe Yes       PLAN: Skin care discussed with patient. Instructions and Comfort Care as listed in Patient Instructions.  Medication as below.    Requested Prescriptions      No prescriptions requested or ordered in this encounter     Risks, benefits, side effects of medication explained and discussed.     Patient Instructions   Follow up for any worsening symptoms    The patient indicates understanding of these issues and agrees to the plan.  The patient is asked to see PCP in 3 days if symptoms not improving or for worsening symptoms.

## 2024-10-01 ENCOUNTER — LAB ENCOUNTER (OUTPATIENT)
Dept: LAB | Age: 59
End: 2024-10-01
Attending: INTERNAL MEDICINE
Payer: COMMERCIAL

## 2024-10-01 DIAGNOSIS — R73.03 BORDERLINE DIABETES: Primary | ICD-10-CM

## 2024-10-01 LAB
CHOLEST SERPL-MCNC: 145 MG/DL (ref ?–200)
DEPRECATED HBV CORE AB SER IA-ACNC: 52 NG/ML
EST. AVERAGE GLUCOSE BLD GHB EST-MCNC: 123 MG/DL (ref 68–126)
FASTING PATIENT LIPID ANSWER: YES
HBA1C MFR BLD: 5.9 % (ref ?–5.7)
HDLC SERPL-MCNC: 56 MG/DL (ref 40–59)
IRON SERPL-MCNC: 51 UG/DL
LDLC SERPL CALC-MCNC: 79 MG/DL (ref ?–100)
NONHDLC SERPL-MCNC: 89 MG/DL (ref ?–130)
TRIGL SERPL-MCNC: 41 MG/DL (ref 30–149)
VLDLC SERPL CALC-MCNC: 6 MG/DL (ref 0–30)

## 2024-10-01 PROCEDURE — 83540 ASSAY OF IRON: CPT

## 2024-10-01 PROCEDURE — 80061 LIPID PANEL: CPT

## 2024-10-01 PROCEDURE — 82728 ASSAY OF FERRITIN: CPT

## 2024-10-01 PROCEDURE — 83036 HEMOGLOBIN GLYCOSYLATED A1C: CPT

## 2024-10-01 PROCEDURE — 36415 COLL VENOUS BLD VENIPUNCTURE: CPT

## 2024-12-04 ENCOUNTER — HOSPITAL ENCOUNTER (OUTPATIENT)
Age: 59
Discharge: HOME | End: 2024-12-04
Attending: EMERGENCY MEDICINE
Payer: COMMERCIAL

## 2024-12-04 VITALS
SYSTOLIC BLOOD PRESSURE: 142 MMHG | BODY MASS INDEX: 32.2 KG/M2 | WEIGHT: 175 LBS | RESPIRATION RATE: 18 BRPM | HEART RATE: 77 BPM | DIASTOLIC BLOOD PRESSURE: 80 MMHG | HEIGHT: 62 IN | TEMPERATURE: 98 F | OXYGEN SATURATION: 100 %

## 2024-12-04 DIAGNOSIS — J01.00 ACUTE MAXILLARY SINUSITIS, RECURRENCE NOT SPECIFIED: Primary | ICD-10-CM

## 2024-12-04 PROCEDURE — 99214 OFFICE O/P EST MOD 30 MIN: CPT

## 2024-12-04 PROCEDURE — 99213 OFFICE O/P EST LOW 20 MIN: CPT

## 2024-12-04 RX ORDER — AZITHROMYCIN 250 MG/1
TABLET, FILM COATED ORAL
Qty: 6 TABLET | Refills: 0 | Status: SHIPPED | OUTPATIENT
Start: 2024-12-04 | End: 2024-12-09

## 2024-12-04 NOTE — ED INITIAL ASSESSMENT (HPI)
Pt states for last 2 weeks has had cough, congestion, sore throat, rt ear pain.    Denies fever. Having pain/pressure to left side of face. Took a covid test last week and negative.

## 2024-12-04 NOTE — ED PROVIDER NOTES
Patient Seen in: [facility]      History     Chief Complaint   Patient presents with    Cough/URI     Stated Complaint: sinus infection    Subjective:   HPI      60 yo female presents with sore throat, sinus congestion, and facial pressure for 1 week.  Denies any fever or chills.  No productive cough or sputum.      Objective:     Past Medical History:    Depression    History of shingles              Past Surgical History:   Procedure Laterality Date    Cholecystectomy      Knee surgery Left 04/2023    Total abdom hysterectomy      partial                No pertinent social history.            Review of Systems    Positive for stated complaint: sinus infection  Other systems are as noted in HPI.  Constitutional and vital signs reviewed.      All other systems reviewed and negative except as noted above.    Physical Exam     ED Triage Vitals [12/04/24 1541]   /80   Pulse 77   Resp 18   Temp 98.1 °F (36.7 °C)   Temp src Oral   SpO2 100 %   O2 Device None (Room air)       Current Vitals:   Vital Signs  BP: 142/80  Pulse: 77  Resp: 18  Temp: 98.1 °F (36.7 °C)  Temp src: Oral    Oxygen Therapy  SpO2: 100 %  O2 Device: None (Room air)        Physical Exam  General: Alert and oriented. No acute distress.  HEENT: Normocephalic. No evidence of trauma. Extraocular movements are intact.  Cardiovascular exam: Regular rate and rhythm  Lungs: Clear to auscultation bilaterally.  Abdomen: Soft, nondistended, nontender.  Extremities: No evidence of deformity. No clubbing or cyanosis.  Neuro: No focal deficit is noted.    ED Course   Labs Reviewed - No data to display  Clinical history and exam consistent with acute sinusitis. Hx of PCN allergies.  Will discharge home with zithromax.  Follow up with PCP.       MDM   Patient was screened and evaluated during this visit.   As a treating physician attending to the patient, I determined, within reasonable clinical confidence and prior to discharge, that an  emergency medical condition was not or was no longer present.  There was no indication for further evaluation, treatment or admission on an emergency basis.  Comprehensive verbal and written discharge and follow-up instructions were provided to help prevent relapse or worsening.  Patient was instructed to follow-up with her primary care provider for further evaluation and treatment, but to return immediately to the ER for worsening, concerning, new, changing or persisting symptoms.  I discussed the case with the patient and they had no questions, complaints, or concerns.  Patient felt comfortable going home.    ^^Please note that this report has been produced using speech recognition software and may contain errors related to that system including, but not limited to, errors in grammar, punctuation, and spelling, as well as words and phrases that possibly may have been recognized inappropriately.  If there are any questions or concerns, contact the dictating provider for clarification        Medical Decision Making      Disposition and Plan     Clinical Impression:  1. Acute maxillary sinusitis, recurrence not specified         Disposition:  Discharge  12/4/2024  4:02 pm    Follow-up:  Terence Vicente MD  [address] [place] [id number] [phone]    Call   As needed, If symptoms worsen          Medications Prescribed:  Current Discharge Medication List        START taking these medications    Details   azithromycin (ZITHROMAX Z-MI) 250 MG Oral Tab 500 mg once followed by 250 mg daily x 4 days  Qty: 6 tablet, Refills: 0                 Supplementary Documentation:

## 2024-12-04 NOTE — DISCHARGE INSTRUCTIONS
Follow up with your primary care doctor  Take zithromax as prescribed  Take tylenol or motrin for fever  Push fluids  Return if any worsening symptoms or new concern

## 2025-02-05 ENCOUNTER — APPOINTMENT (OUTPATIENT)
Dept: CT IMAGING | Age: 60
End: 2025-02-05
Attending: NURSE PRACTITIONER
Payer: COMMERCIAL

## 2025-02-05 ENCOUNTER — HOSPITAL ENCOUNTER (EMERGENCY)
Age: 60
Discharge: LEFT BEFORE DISCHARGE | End: 2025-02-05
Payer: COMMERCIAL

## 2025-02-05 ENCOUNTER — HOSPITAL ENCOUNTER (OUTPATIENT)
Age: 60
Discharge: HOME | End: 2025-02-05
Payer: COMMERCIAL

## 2025-02-05 ENCOUNTER — APPOINTMENT (OUTPATIENT)
Dept: GENERAL RADIOLOGY | Age: 60
End: 2025-02-05
Attending: NURSE PRACTITIONER
Payer: COMMERCIAL

## 2025-02-05 VITALS
SYSTOLIC BLOOD PRESSURE: 144 MMHG | DIASTOLIC BLOOD PRESSURE: 79 MMHG | RESPIRATION RATE: 18 BRPM | HEART RATE: 94 BPM | OXYGEN SATURATION: 98 % | TEMPERATURE: 99 F

## 2025-02-05 DIAGNOSIS — R07.9 CHEST PAIN OF UNCERTAIN ETIOLOGY: Primary | ICD-10-CM

## 2025-02-05 LAB
#MXD IC: 0.6 X10ˆ3/UL (ref 0.1–1)
ATRIAL RATE: 90 BPM
BUN BLD-MCNC: 6 MG/DL (ref 7–18)
CHLORIDE BLD-SCNC: 105 MMOL/L (ref 98–112)
CO2 BLD-SCNC: 23 MMOL/L (ref 21–32)
CREAT BLD-MCNC: 0.7 MG/DL
DDIMER WHOLE BLOOD: 880 NG/ML DDU (ref ?–400)
EGFRCR SERPLBLD CKD-EPI 2021: 98 ML/MIN/1.73M2 (ref 60–?)
GLUCOSE BLD-MCNC: 97 MG/DL (ref 70–99)
HCT VFR BLD AUTO: 39.2 %
HCT VFR BLD CALC: 42 %
HGB BLD-MCNC: 12.8 G/DL
ISTAT IONIZED CALCIUM FOR CHEM 8: 1.2 MMOL/L (ref 1.12–1.32)
LYMPHOCYTES # BLD AUTO: 2.2 X10ˆ3/UL (ref 1–4)
LYMPHOCYTES NFR BLD AUTO: 20.4 %
MCH RBC QN AUTO: 29.4 PG (ref 26–34)
MCHC RBC AUTO-ENTMCNC: 32.7 G/DL (ref 31–37)
MCV RBC AUTO: 90.1 FL (ref 80–100)
MIXED CELL %: 6 %
NEUTROPHILS # BLD AUTO: 7.9 X10ˆ3/UL (ref 1.5–7.7)
NEUTROPHILS NFR BLD AUTO: 73.6 %
P AXIS: 58 DEGREES
P-R INTERVAL: 140 MS
PLATELET # BLD AUTO: 207 X10ˆ3/UL (ref 150–450)
POTASSIUM BLD-SCNC: 3.8 MMOL/L (ref 3.6–5.1)
Q-T INTERVAL: 352 MS
QRS DURATION: 92 MS
QTC CALCULATION (BEZET): 430 MS
R AXIS: 23 DEGREES
RBC # BLD AUTO: 4.35 X10ˆ6/UL
SODIUM BLD-SCNC: 141 MMOL/L (ref 136–145)
T AXIS: 31 DEGREES
TROPONIN I BLD-MCNC: 0.03 NG/ML
VENTRICULAR RATE: 90 BPM
WBC # BLD AUTO: 10.7 X10ˆ3/UL (ref 4–11)

## 2025-02-05 PROCEDURE — 93010 ELECTROCARDIOGRAM REPORT: CPT

## 2025-02-05 PROCEDURE — 71046 X-RAY EXAM CHEST 2 VIEWS: CPT | Performed by: NURSE PRACTITIONER

## 2025-02-05 PROCEDURE — 71260 CT THORAX DX C+: CPT | Performed by: NURSE PRACTITIONER

## 2025-02-05 PROCEDURE — 84484 ASSAY OF TROPONIN QUANT: CPT

## 2025-02-05 PROCEDURE — 85025 COMPLETE CBC W/AUTO DIFF WBC: CPT | Performed by: NURSE PRACTITIONER

## 2025-02-05 PROCEDURE — 85378 FIBRIN DEGRADE SEMIQUANT: CPT | Performed by: NURSE PRACTITIONER

## 2025-02-05 PROCEDURE — 99215 OFFICE O/P EST HI 40 MIN: CPT

## 2025-02-05 PROCEDURE — 36415 COLL VENOUS BLD VENIPUNCTURE: CPT

## 2025-02-05 PROCEDURE — 80047 BASIC METABLC PNL IONIZED CA: CPT

## 2025-02-05 PROCEDURE — 93005 ELECTROCARDIOGRAM TRACING: CPT

## 2025-02-05 RX ORDER — LIDOCAINE HYDROCHLORIDE 20 MG/ML
10 SOLUTION OROPHARYNGEAL ONCE
Status: COMPLETED | OUTPATIENT
Start: 2025-02-05 | End: 2025-02-05

## 2025-02-05 RX ORDER — DICYCLOMINE HYDROCHLORIDE 10 MG/5ML
20 SOLUTION ORAL ONCE
Status: COMPLETED | OUTPATIENT
Start: 2025-02-05 | End: 2025-02-05

## 2025-02-05 RX ORDER — MAGNESIUM HYDROXIDE/ALUMINUM HYDROXICE/SIMETHICONE 120; 1200; 1200 MG/30ML; MG/30ML; MG/30ML
30 SUSPENSION ORAL ONCE
Status: COMPLETED | OUTPATIENT
Start: 2025-02-05 | End: 2025-02-05

## 2025-02-05 NOTE — DISCHARGE INSTRUCTIONS
[place] diet.  Drink plenty of fluids.  You can try Pepcid or Nexium over-the-counter to see if this helps with the discomfort.  Follow-up closely with your primary care provider.  If your symptoms worsen in any way, please go to the nearest emergency department for further evaluation

## 2025-02-06 NOTE — ED PROVIDER NOTES
He    Patient Seen in: [facility]      History     Chief Complaint   Patient presents with    Chest Pain     Stated Complaint: Back Pain  Subjective:   59-year-old female with a history of depression presents for chest pain that started a day and a half ago.  She has chest pain to the left side of her chest, midsternal area and it radiates to the left upper back and left arm.  She states it is positional.  No numbness or tingling.  No weakness.  No URI symptoms.  No fevers or chills.  No recent illness.  No swelling to her lower extremities.  No history of this pain in the past.  No recent travel.  She is not on hormone therapy.  No personal or family history of blood clot.  She has been eating and drinking normally.  No nausea or vomiting.  No diaphoresis with the pain.  No dizziness or headaches.  She is here with her .  She did try taking Tums as she felt it was GERD without relief.  No personal or family cardiac history.  She appears nontoxic.      Objective:   Past Medical History:    Depression    History of shingles            Past Surgical History:   Procedure Laterality Date    Cholecystectomy      Knee surgery Left 04/2023    Total abdom hysterectomy      partial              Social History     Socioeconomic History    Marital status:    Tobacco Use    Smoking status: Never    Smokeless tobacco: Never   Vaping Use    Vaping status: Never Used   Substance and Sexual Activity    Alcohol use: No     Alcohol/week: 0.0 standard drinks of alcohol    Drug use: No     Social Drivers of Health      Received from [facility]            Review of Systems    Positive for stated complaint: Chest Pain     Other systems are as noted in HPI.  Constitutional and vital signs reviewed.      All other systems reviewed and negative except as noted above.    Physical Exam     ED Triage Vitals [02/05/25 1342]   /79   Pulse 94   Resp 18   Temp 98.5 °F (36.9 °C)   Temp src Oral    SpO2 98 %   O2 Device None (Room air)     Current:/79   Pulse 94   Temp 98.5 °F (36.9 °C) (Oral)   Resp 18   SpO2 98%     Physical Exam  Vitals and nursing note reviewed.   Constitutional:       General: She is not in acute distress.     Appearance: Normal appearance. She is not toxic-appearing.   HENT:      Head: Normocephalic.      Nose: Nose normal.      Mouth/Throat:      Mouth: Mucous membranes are moist.   Cardiovascular:      Rate and Rhythm: Normal rate and regular rhythm.      Comments: Pain to the left upper chest that radiates into the back with certain movements.  Nonlabored respirations.  No point tenderness.  Pulmonary:      Effort: Pulmonary effort is normal.      Breath sounds: Normal breath sounds. No wheezing, rhonchi or rales.   Chest:      Chest wall: No tenderness.   Abdominal:      Palpations: Abdomen is soft.      Tenderness: There is no abdominal tenderness. There is no right CVA tenderness or left CVA tenderness.   Musculoskeletal:         General: Normal range of motion.      Cervical back: Normal range of motion.   Lymphadenopathy:      Cervical: No cervical adenopathy.   Skin:     General: Skin is warm and dry.      Capillary Refill: Capillary refill takes less than 2 seconds.      Coloration: Skin is not pale.      Findings: No rash.   Neurological:      General: No focal deficit present.      Mental Status: She is alert and oriented to person, place, and time.   Psychiatric:         Mood and Affect: Mood normal.         Behavior: Behavior normal.         ED Course   CT CHEST PE AORTA (IV ONLY) (CPT=71260)    Result Date: 2/5/2025  CONCLUSION:  Negative for pulmonary embolism or other acute cardiopulmonary process.   LOCATION:  [facility]   Dictated by (CST): Charis Mckinnon MD on 2/05/2025 at 4:04 PM     Finalized by (CST): Charis Mckinnon MD on 2/05/2025 at 4:07 PM       XR CHEST PA + LAT CHEST (CPT=71046)    Result Date: 2/5/2025  CONCLUSION:  Stable cardiac and mediastinal contours.   No pulmonary edema.  Lateral view best  demonstrates patchy retrocardiac opacity which may reflect atelectasis or pneumonia.  No associated pleural effusion or pneumothorax.  Degenerative change and biconvex scoliotic curvature of the thoracolumbar spine.  Regarding possible retrocardiac pneumonia, recommend imaging follow-up to resolution to exclude an underlying mass lesion.  LOCATION:  [facility]   Dictated by (CST): Charis Mckinnon MD on 2/05/2025 at 2:54 PM     Finalized by (CST): Charis Mckinnon MD on 2/05/2025 at 2:56 PM      Labs Reviewed   POCT CBC - Abnormal; Notable for the following components:       Result Value    # Neutrophil 7.9 (*)     All other components within normal limits   D-DIMER (POC) - Abnormal; Notable for the following components:    D-Dimer  (*)     All other components within normal limits   POCT ISTAT CHEM8 CARTRIDGE - Abnormal; Notable for the following components:    ISTAT BUN 6 (*)     All other components within normal limits   ISTAT TROPONIN - Normal       MDM     Medical Decision Making  The patient is aware of all of the testing results below.  We discussed that this pain may be GERD or muscular.  A GI cocktail was tried with some relief.  We discussed supportive care including pushing fluids, ibuprofen as needed for pain or fever, rest, and close follow-up with her primary care provider.  She is aware if she develops any worsening or concerning symptoms, to go to the nearest emergency department for further evaluation.    Amount and/or Complexity of Data Reviewed  Independent Historian: bartolome  Labs: ordered.     Details: The patient's CBC, i-STAT, and troponin are unremarkable  The patient's D-dimer is elevated at 880.  Radiology: ordered.     Details: I personally visualized the chest x-ray images which showed no pulmonary edema and a patchy retrocardiac opacity that may reflect atelectasis versus pneumonia.  Due to the elevated D-dimer a CT of the chest with contrast was done.   The CT of the chest is negative for any PE or other acute cardiopulmonary process.  ECG/medicine tests: ordered.     Details: Heart rate 90  Normal sinus rhythm  Left atrial large minute with possible early repolarization.  No significant changes from previous  Discussion of management or test interpretation with external provider(s): I discussed this patient with Dr. Cook.  She agrees with plan of care.    Risk  OTC drugs.  Risk Details: Musculoskeletal chest pain versus acute MI versus PE versus pneumonia    Okay  Heart Score:    HEART Score      Title      Criteria Score   Age: 45-64 Age Score: 1   History: Slightly Suspicious Hx Score: 0     EKG: Non-Spec Changes EKG Score: 1   HTN: No   Hypercholesterolemia: No   Atherosclerosis/PVD: No     DM: No   BMI>30kg/m2: Yes   Smoking: No   Family History: No         Other Risk Factor Score: 1               Lab Results   Component Value Date    ITROP 0.03 02/05/2025         HEART Score: 3        Risk of adverse cardiac event is 0.9-1.7%             Disposition and Plan     Clinical Impression:  1. Chest pain of uncertain etiology         Disposition:  Discharge  2/5/2025  4:27 pm    Follow-up:  Terence Vicente MD  [address] [place] [id number]  [phone]    Call   to arrange close follow up appointment          Medications Prescribed:  Discharge Medication List as of 2/5/2025  4:29 PM

## 2025-03-08 ENCOUNTER — DIABETIC EDUCATION (OUTPATIENT)
Facility: CLINIC | Age: 60
End: 2025-03-08
Payer: COMMERCIAL

## 2025-03-08 VITALS — WEIGHT: 180 LBS | BODY MASS INDEX: 33 KG/M2

## 2025-03-08 DIAGNOSIS — E11.9 DIABETES MELLITUS TYPE 2, NONINSULIN DEPENDENT (HCC): Primary | ICD-10-CM

## 2025-03-08 PROCEDURE — 97802 MEDICAL NUTRITION INDIV IN: CPT

## 2025-03-08 PROCEDURE — 99998 NO SHOW: CPT

## 2025-03-08 RX ORDER — BLOOD-GLUCOSE METER
1 EACH MISCELLANEOUS ONCE
Qty: 1 KIT | Refills: 0 | Status: SHIPPED
Start: 2025-03-08 | End: 2025-03-08

## 2025-03-08 RX ORDER — BLOOD SUGAR DIAGNOSTIC
STRIP MISCELLANEOUS
Qty: 100 STRIP | Refills: 4 | Status: SHIPPED
Start: 2025-03-08

## 2025-03-08 RX ORDER — LANCETS 33 GAUGE
1 EACH MISCELLANEOUS DAILY
Qty: 100 EACH | Refills: 4 | Status: SHIPPED
Start: 2025-03-08 | End: 2026-03-08

## 2025-03-08 NOTE — PROGRESS NOTES
Medical Nutrition Therapy Assessment    Shruthi Case 8/29/1965 was seen for individual Diabetic Medical Nutrition Therapy- an initial consult:    Date: 3/8/2025   PCP: Terence Vicente MD  Start time 11am  End time: 12 pm    Assessment  Looking for nutrition education on diabetes and weight loss. Highest A1c 6.4.       Anthropometrics:  Wt Readings from Last 6 Encounters:   12/04/24 175 lb   09/19/24 183 lb   10/07/23 195 lb   12/23/22 210 lb   06/20/22 195 lb   12/23/21 190 lb         Current diabetes medications:  12.5 monjaro  Previous   Ozempic- switched to Monjaro  Metformin - stopped    Taking correctly: yes    Labs:  Lab Results   Component Value Date    A1C 5.9 (H) 10/01/2024    A1C 6.4 (H) 12/07/2022    CHOLEST 145 10/01/2024    CHOLEST 126 12/07/2022    LDL 79 10/01/2024    LDL 59 12/07/2022    HDL 56 10/01/2024    HDL 57 12/07/2022    NONHDLC 89 10/01/2024    NONHDLC 69 12/07/2022    TRIG 41 10/01/2024    TRIG 36 12/07/2022    BUN 11 12/07/2022    BUN 11 12/17/2021    CREATSERUM 0.74 12/07/2022    CREATSERUM 0.71 12/17/2021    GFRNAA 94 12/17/2021    GFRNAA 87 08/29/2021    GFRAA 109 12/17/2021    GFRAA 100 08/29/2021       Lab Results   Component Value Date    A1C 5.9 (H) 10/01/2024    A1C 6.4 (H) 12/07/2022    A1C 6.2 (H) 12/17/2021         Glucose testing at home:     Blood Glucose Meter:  Meter Brand: Previous OneTouch  Currently testing blood glucose: No  Will order a new meter      Personal history:  Work: work from home- , 8am to 6pm  Family: , son, 2 grandchildren 9-7  Sleep: 11 pm up by 6 for work out, most of the time 8am  Family hx of diabetes: Mom, Aunt      Diet History:  Usually eats 3 meals and 1 snacks a day  (a.m.) 1/2 rob killer bread, egg with veg. Cottage cheese, with eggs, fair life protein drink  (mid-day) Wrap w/ turkey, greens, malave chicken w/lettuce tomatoes, cucumber  Snack: cheese sharp cheddar, crackers, apples, greek yogurt, Fairlife protein  (p.m.)  Meat, vegetables, sweet potato, salad -  beef or pork  (snacks) dark chocolate  (Beverages) Water 60oz, Powerade zero, ICE flavored water      Physical Activity:  Minimal   Knee pain, cross  Marching, stretching, small weights. Walking around the house a few times.         Nutrition Diagnosis  PES: Unintentional weight gain related to living with diabetes and inactivity as evidenced by elevated HBA1c, carbohydrate intake greater than estimated needs, unintentional weight gain, physical inactivity, and illness or physical disability      NUTRITION PRESCRIPTION:   Calories: 0812-2850 calories/day (25 calories per kg IBW and adjusted IBW)  Protein: 120 grams protein/day (1.5 grams protein per kg)  Fluid: ~1 ml/kcal or per MD discretion        Intervention  -Comprehensive Nutrition Education Provided:  Reviewed carbohydrate counting and label reading.  Recommended carbohydrate targets of 30 grams at meals and 15 grams at snacks.  Dicussed how to use and the benefits of food tracking logs/applications.  Discussed how SMART goals encourage small sustainable changes that lead to big impacts.  Provided suggestions for carbohydrate controlled snacks.  Discussed behavior modification to achieve and maintain weight loss of 5% or more.      -Education on Increased Physical Activity:  discussed how increased physical activity improves insulin resistance, blood glucose control, and heart health and discussed the risk and prevention of hypoglycemia with increased physical activity      Monitoring/Evaluation  Diet modification/understanding  Medication compliance/understanding  Blood sugars  Blood sugar monitoring compliance  Hemoglobin A1c  Weight trends  Physical activity      Recommendations  Practice healthful eating patterns, emphasizing a variety of nutrient dense foods in appropriate portion sizes.    Take medications as prescribed  Practice carbohydrate counting and label reading  Increase or continue physical  activity of at least 150 mins, over at least 3 days a week or per doctors recommendations.   Achieve and maintain weight loss goals.     Patient Specific Goals:    Physical activity: Protein drink prior to working out, Mornings 6am Cross  - 30 mins, everyday. In the evening weights.     Other [facility]     Glucose monitoring: Check fasting at least once a month to monitor glucose. Also you can check two hours after a meal when eating new foods/meals.       Blood Glucose Goals  Blood sugar goals: less than 120 mg/dl in the morning and less than 150 mg/dl 2 hours after meals.  Keep glucose tabs or fast acting carbohydrates with you for treatment of lows; for blood glucose levels less than 70 mg/dl, take 15 grams of fast acting carbohydrates (3-4 glucose tabs, 4 ounces of juice, or as discussed). Retest in 15 min. If not above 70 mg/dl, retreat.      Follow up: Recommended in 2-3  Relevant handouts provided.  Pt has no further questions at this time.     Eri Singh, SHAYYN, LDN, CDCES  Certified Diabetes Care and   Registered Dietitian

## 2025-03-08 NOTE — PATIENT INSTRUCTIONS
Shruthi, it was a pleasure meeting you today. Below is a summary of our visit.       Recommendations  Practice healthful eating patterns, emphasizing a variety of nutrient dense foods in appropriate portion sizes.    Take medications as prescribed  Practice carbohydrate counting and label reading  Increase or continue physical activity of at least 150 mins, over at least 3 days a week or per doctors recommendations.   Achieve and maintain weight loss goals.     Patient Specific Goals:    Physical activity: Protein drink prior to working out, Mornings 6am Cross  - 30 mins, everyday. In the evening weights.     Other [facility]     Glucose monitoring: Check fasting at least once a month to monitor glucose. Also you can check two hours after a meal when eating new foods/meals.       Blood Glucose Goals  Blood sugar goals: less than 120 mg/dl in the morning and less than 150 mg/dl 2 hours after meals.  Keep glucose tabs or fast acting carbohydrates with you for treatment of lows; for blood glucose levels less than 70 mg/dl, take 15 grams of fast acting carbohydrates (3-4 glucose tabs, 4 ounces of juice, or as discussed). Retest in 15 min. If not above 70 mg/dl, retreat.      Follow up: Recommended in 2-3  Please call the [facility] with any questions or concerns [phone].    Eri Singh, SHAYYN, LDN, CDCES  Certified Diabetes Care and   Registered Dietitian

## 2025-03-13 DIAGNOSIS — E11.9 DIABETES MELLITUS TYPE 2, NONINSULIN DEPENDENT (HCC): Primary | ICD-10-CM

## 2025-03-13 NOTE — TELEPHONE ENCOUNTER
Patient saw Eri on Saturday. Was supposed to have ordered a acue check . Has not received thru her pharmacy. Sent Eri message alos thru epic chat

## 2025-03-14 RX ORDER — BLOOD-GLUCOSE METER
1 EACH MISCELLANEOUS ONCE
Qty: 1 KIT | Refills: 0 | Status: CANCELLED | OUTPATIENT
Start: 2025-03-14 | End: 2025-03-14

## 2025-03-14 RX ORDER — LANCETS
EACH MISCELLANEOUS
Qty: 100 EACH | Refills: 4 | Status: CANCELLED | OUTPATIENT
Start: 2025-03-14

## 2025-03-14 NOTE — TELEPHONE ENCOUNTER
Orders sent in were for One Touch - per visit with Eri storey patient was previously using - pended Accucheck supplies if agreeable - insurance would most likely prefer One Touch supplies

## 2025-03-15 RX ORDER — LANCETS
EACH MISCELLANEOUS
Qty: 100 EACH | Refills: 4 | Status: SHIPPED
Start: 2025-03-15

## 2025-03-15 RX ORDER — BLOOD SUGAR DIAGNOSTIC
STRIP MISCELLANEOUS
Qty: 100 EACH | Refills: 4 | Status: SHIPPED
Start: 2025-03-15

## 2025-03-15 RX ORDER — BLOOD-GLUCOSE METER
1 EACH MISCELLANEOUS ONCE
Qty: 1 KIT | Refills: 0 | Status: SHIPPED
Start: 2025-03-15 | End: 2025-03-15

## (undated) NOTE — LETTER
11/9/2021          To Whom It May Concern:    Derryl Gitelman is currently under my medical care and may not return to work, until 12/08/2021 with continued restrictions. If you require additional information please contact our office.         Sincerely,

## (undated) NOTE — LETTER
Date & Time: 10/28/2018, 7:26 PM  Patient: Chantel Safe  Encounter Provider(s):    DALJIT Lobo       To Whom It May Concern:    Marquita Middleton was seen and treated in our department on 10/28/2018.  She should not return to work until 10/31/20

## (undated) NOTE — ED AVS SNAPSHOT
Mauro Lynn   MRN: [id number]    Department:  [facility] [place]   Date of Visit:  10/28/2018           Disclosure     Insurance plans vary and the physician(s) referred by the ER may not be covered by your plan.  Please conta tell this physician (or your personal doctor if your instructions are to return to your personal doctor) about any new or lasting problems. The primary care or specialist physician will see patients referred from the [facility] Emergency Department.  Concha Patel

## (undated) NOTE — LETTER
AUTHORIZATION FOR SURGICAL OPERATION OR OTHER PROCEDURE    1.  I hereby authorize Dr. Greg Kim, and [facility] staff assigned to my case to perform the following operation and/or procedure at the [facility]:    ________________________________ Time:  ________ A. M.  P.M.        Patient Name:  ______________________________________________________  (please print)      Patient signature:  ___________________________________________________             Relationship to Patient:           []  Par